# Patient Record
Sex: FEMALE | Race: WHITE | NOT HISPANIC OR LATINO | Employment: PART TIME | URBAN - NONMETROPOLITAN AREA
[De-identification: names, ages, dates, MRNs, and addresses within clinical notes are randomized per-mention and may not be internally consistent; named-entity substitution may affect disease eponyms.]

---
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
## 2018-05-01 ENCOUNTER — OFFICE VISIT (OUTPATIENT)
Dept: OBSTETRICS AND GYNECOLOGY | Facility: CLINIC | Age: 18
End: 2018-05-01

## 2018-05-01 VITALS
WEIGHT: 238 LBS | BODY MASS INDEX: 46.72 KG/M2 | HEART RATE: 96 BPM | DIASTOLIC BLOOD PRESSURE: 80 MMHG | HEIGHT: 60 IN | SYSTOLIC BLOOD PRESSURE: 113 MMHG

## 2018-05-01 DIAGNOSIS — Z11.3 SCREEN FOR SEXUALLY TRANSMITTED DISEASES: ICD-10-CM

## 2018-05-01 DIAGNOSIS — Z30.011 ENCOUNTER FOR INITIAL PRESCRIPTION OF CONTRACEPTIVE PILLS: Primary | ICD-10-CM

## 2018-05-01 PROCEDURE — 99203 OFFICE O/P NEW LOW 30 MIN: CPT | Performed by: NURSE PRACTITIONER

## 2018-05-01 PROCEDURE — 87491 CHLMYD TRACH DNA AMP PROBE: CPT | Performed by: NURSE PRACTITIONER

## 2018-05-01 PROCEDURE — 87591 N.GONORRHOEAE DNA AMP PROB: CPT | Performed by: NURSE PRACTITIONER

## 2018-05-01 PROCEDURE — 87661 TRICHOMONAS VAGINALIS AMPLIF: CPT | Performed by: NURSE PRACTITIONER

## 2018-05-01 RX ORDER — NORETHINDRONE ACETATE AND ETHINYL ESTRADIOL 1MG-20(21)
1 KIT ORAL DAILY
Qty: 28 TABLET | Refills: 3 | Status: SHIPPED | OUTPATIENT
Start: 2018-05-01 | End: 2019-05-01

## 2018-05-02 ENCOUNTER — TELEPHONE (OUTPATIENT)
Dept: OBSTETRICS AND GYNECOLOGY | Facility: CLINIC | Age: 18
End: 2018-05-02

## 2018-05-02 LAB
C TRACH RRNA CVX QL NAA+PROBE: POSITIVE
N GONORRHOEA RRNA SPEC QL NAA+PROBE: NEGATIVE
T VAGINALIS DNA VAG QL PROBE+SIG AMP: NEGATIVE

## 2018-05-02 RX ORDER — AZITHROMYCIN 500 MG/1
TABLET, FILM COATED ORAL
Qty: 4 TABLET | Refills: 0 | Status: SHIPPED | OUTPATIENT
Start: 2018-05-02 | End: 2020-01-06

## 2018-05-02 NOTE — TELEPHONE ENCOUNTER
----- Message from JOSTIN Delgado sent at 5/2/2018 12:55 PM CDT -----  + for chlamydia. Needs azithromycin 500mg 4 tablets by mouth x1 with food. Partner also needs to be treated prior to resuming intercourse.

## 2018-05-03 PROBLEM — Z30.011 ENCOUNTER FOR INITIAL PRESCRIPTION OF CONTRACEPTIVE PILLS: Status: ACTIVE | Noted: 2018-05-03

## 2018-05-03 NOTE — PROGRESS NOTES
Subjective   Ruth Mancini is a 18 y.o. female. G0 here to discuss birth control options. Has no concerns with her periods at this time but needs contraception.     LMP-  4/21/18  Last pap never  Never had STD screening      Gynecologic Exam   The patient's pertinent negatives include no genital itching, genital lesions, genital odor, genital rash, missed menses, pelvic pain, vaginal bleeding or vaginal discharge. Pertinent negatives include no abdominal pain, constipation, diarrhea, dysuria, headaches, nausea, rash, urgency or vomiting. She is sexually active. No, her partner does not have an STD. She uses nothing for contraception. Her menstrual history has been irregular (Monthly but varies by about 1 week).       The following portions of the patient's history were reviewed and updated as appropriate: allergies, current medications, past family history, past medical history, past social history, past surgical history and problem list.    Review of Systems   Constitutional: Negative for activity change, appetite change, fatigue and unexpected weight change.   HENT: Negative for congestion and trouble swallowing.    Respiratory: Negative for chest tightness and shortness of breath.    Cardiovascular: Negative for chest pain, palpitations and leg swelling.   Gastrointestinal: Negative for abdominal distention, abdominal pain, blood in stool, constipation, diarrhea, nausea and vomiting.   Endocrine: Negative for cold intolerance, heat intolerance, polydipsia, polyphagia and polyuria.   Genitourinary: Negative for difficulty urinating, dyspareunia, dysuria, genital sores, menstrual problem, missed menses, pelvic pain, urgency, vaginal bleeding, vaginal discharge and vaginal pain.   Musculoskeletal: Negative for gait problem and myalgias.   Skin: Negative for color change, pallor and rash.   Neurological: Negative for dizziness, weakness, light-headedness and headaches.   Hematological: Negative for adenopathy.    Psychiatric/Behavioral: Negative for agitation, confusion, dysphoric mood, self-injury and suicidal ideas. The patient is not nervous/anxious.        Objective   Physical Exam   Constitutional: She is oriented to person, place, and time. Vital signs are normal. She appears well-developed and well-nourished. No distress.   Cardiovascular: Normal rate and regular rhythm.    Pulmonary/Chest: Effort normal and breath sounds normal.   Neurological: She is alert and oriented to person, place, and time.   Skin: Skin is warm and dry. Capillary refill takes less than 2 seconds. She is not diaphoretic.   Psychiatric: She has a normal mood and affect.   Nursing note and vitals reviewed.      Assessment/Plan   Ruth was seen today for gynecologic exam.    Diagnoses and all orders for this visit:    Encounter for initial prescription of contraceptive pills    Screen for sexually transmitted diseases  -     Chlamydia trachomatis, Neisseria gonorrhoeae, Trichomonas vaginalis, PCR - Urine, Urine, Random Void    Other orders  -     norethindrone-ethinyl estradiol FE (JUNEL FE 1/20) 1-20 MG-MCG per tablet; Take 1 tablet by mouth Daily.       Education given regarding options for contraception, including barrier methods, injectable contraception, IUD placement, oral contraceptives, Xulane, Nuvaring or Nexplanon. She would like to start with an OCP. Took them in early teens and had no issues with them. R/B/A and administration discussed.

## 2019-06-03 ENCOUNTER — HOSPITAL ENCOUNTER (EMERGENCY)
Facility: HOSPITAL | Age: 19
Discharge: HOME | End: 2019-06-03
Attending: FAMILY MEDICINE | Admitting: FAMILY MEDICINE

## 2019-06-03 VITALS
RESPIRATION RATE: 18 BRPM | SYSTOLIC BLOOD PRESSURE: 115 MMHG | DIASTOLIC BLOOD PRESSURE: 79 MMHG | HEART RATE: 79 BPM | BODY MASS INDEX: 44.62 KG/M2 | WEIGHT: 227.3 LBS | TEMPERATURE: 98.6 F | OXYGEN SATURATION: 99 % | HEIGHT: 60 IN

## 2019-06-03 DIAGNOSIS — R21 RASH: Primary | ICD-10-CM

## 2019-06-03 DIAGNOSIS — K62.5 RECTAL BLEEDING: ICD-10-CM

## 2019-06-03 LAB
ALBUMIN SERPL-MCNC: 4 G/DL (ref 3.5–5.2)
ALBUMIN/GLOB SERPL: 1.2 G/DL
ALP SERPL-CCNC: 66 U/L (ref 39–117)
ALT SERPL W P-5'-P-CCNC: 10 U/L (ref 1–33)
ANION GAP SERPL CALCULATED.3IONS-SCNC: 11 MMOL/L
APTT PPP: 30.9 SECONDS (ref 20–40.3)
AST SERPL-CCNC: 10 U/L (ref 1–32)
BASOPHILS # BLD AUTO: 0.06 10*3/MM3 (ref 0–0.2)
BASOPHILS NFR BLD AUTO: 0.8 % (ref 0–1.5)
BILIRUB SERPL-MCNC: 0.5 MG/DL (ref 0.2–1.2)
BUN BLD-MCNC: 11 MG/DL (ref 6–20)
BUN/CREAT SERPL: 16.4 (ref 7–25)
CALCIUM SPEC-SCNC: 9.3 MG/DL (ref 8.6–10.5)
CHLORIDE SERPL-SCNC: 103 MMOL/L (ref 98–107)
CO2 SERPL-SCNC: 25 MMOL/L (ref 22–29)
CREAT BLD-MCNC: 0.67 MG/DL (ref 0.57–1)
DEPRECATED RDW RBC AUTO: 36.7 FL (ref 37–54)
EOSINOPHIL # BLD AUTO: 0.46 10*3/MM3 (ref 0–0.4)
EOSINOPHIL NFR BLD AUTO: 5.9 % (ref 0.3–6.2)
ERYTHROCYTE [DISTWIDTH] IN BLOOD BY AUTOMATED COUNT: 12 % (ref 12.3–15.4)
GFR SERPL CREATININE-BSD FRML MDRD: 111 ML/MIN/1.73
GLOBULIN UR ELPH-MCNC: 3.4 GM/DL
GLUCOSE BLD-MCNC: 96 MG/DL (ref 65–99)
HCG SERPL QL: NEGATIVE
HCT VFR BLD AUTO: 41.3 % (ref 34–46.6)
HGB BLD-MCNC: 14.2 G/DL (ref 12–15.9)
IMM GRANULOCYTES # BLD AUTO: 0.02 10*3/MM3 (ref 0–0.05)
IMM GRANULOCYTES NFR BLD AUTO: 0.3 % (ref 0–0.5)
INR PPP: 0.93 (ref 0.8–1.2)
LYMPHOCYTES # BLD AUTO: 1.86 10*3/MM3 (ref 0.7–3.1)
LYMPHOCYTES NFR BLD AUTO: 24 % (ref 19.6–45.3)
MCH RBC QN AUTO: 28.5 PG (ref 26.6–33)
MCHC RBC AUTO-ENTMCNC: 34.4 G/DL (ref 31.5–35.7)
MCV RBC AUTO: 82.9 FL (ref 79–97)
MONOCYTES # BLD AUTO: 0.74 10*3/MM3 (ref 0.1–0.9)
MONOCYTES NFR BLD AUTO: 9.5 % (ref 5–12)
NEUTROPHILS # BLD AUTO: 4.61 10*3/MM3 (ref 1.7–7)
NEUTROPHILS NFR BLD AUTO: 59.5 % (ref 42.7–76)
NRBC BLD AUTO-RTO: 0 /100 WBC (ref 0–0.2)
PLATELET # BLD AUTO: 282 10*3/MM3 (ref 140–450)
PMV BLD AUTO: 9.7 FL (ref 6–12)
POTASSIUM BLD-SCNC: 3.9 MMOL/L (ref 3.5–5.2)
PROT SERPL-MCNC: 7.4 G/DL (ref 6–8.5)
PROTHROMBIN TIME: 12.3 SECONDS (ref 11.1–15.3)
RBC # BLD AUTO: 4.98 10*6/MM3 (ref 3.77–5.28)
SODIUM BLD-SCNC: 139 MMOL/L (ref 136–145)
WBC NRBC COR # BLD: 7.75 10*3/MM3 (ref 3.4–10.8)

## 2019-06-03 PROCEDURE — 85730 THROMBOPLASTIN TIME PARTIAL: CPT | Performed by: FAMILY MEDICINE

## 2019-06-03 PROCEDURE — 85610 PROTHROMBIN TIME: CPT | Performed by: FAMILY MEDICINE

## 2019-06-03 PROCEDURE — 80053 COMPREHEN METABOLIC PANEL: CPT | Performed by: FAMILY MEDICINE

## 2019-06-03 PROCEDURE — 84703 CHORIONIC GONADOTROPIN ASSAY: CPT | Performed by: FAMILY MEDICINE

## 2019-06-03 PROCEDURE — 85025 COMPLETE CBC W/AUTO DIFF WBC: CPT | Performed by: FAMILY MEDICINE

## 2019-06-03 PROCEDURE — 99283 EMERGENCY DEPT VISIT LOW MDM: CPT

## 2019-06-03 RX ORDER — SODIUM CHLORIDE 0.9 % (FLUSH) 0.9 %
10 SYRINGE (ML) INJECTION AS NEEDED
Status: DISCONTINUED | OUTPATIENT
Start: 2019-06-03 | End: 2019-06-03 | Stop reason: HOSPADM

## 2019-06-03 RX ORDER — SERTRALINE HYDROCHLORIDE 25 MG/1
25 TABLET, FILM COATED ORAL DAILY
COMMUNITY
End: 2020-01-06

## 2019-06-03 NOTE — ED PROVIDER NOTES
Subjective   19-year-old morbidly obese female emergency department with a rash on her left ankle and bilateral wrists.  She noted these yesterday.  At the of her discussion, she states that she had painless bleeding from her rectum yesterday.  She states that she had multiple clots and a lot of bleeding noted.  She is never had similar symptoms recently.  She had a normal earlier today and states that she has normal bleeding issues with those but nothing as significant as yesterday.            Review of Systems   Constitutional: Negative for activity change, appetite change, chills, fatigue, fever and unexpected weight change.   HENT: Negative for nosebleeds, rhinorrhea, sore throat, trouble swallowing and voice change.    Eyes: Negative for photophobia, pain and visual disturbance.   Respiratory: Negative for apnea, cough, chest tightness, shortness of breath, wheezing and stridor.    Cardiovascular: Negative for chest pain, palpitations and leg swelling.   Gastrointestinal: Positive for blood in stool. Negative for abdominal distention, abdominal pain, constipation, diarrhea, nausea and vomiting.   Endocrine: Negative for cold intolerance, heat intolerance, polydipsia and polyuria.   Genitourinary: Negative for decreased urine volume, difficulty urinating, dysuria, flank pain, hematuria and urgency.   Musculoskeletal: Negative for arthralgias, myalgias, neck pain and neck stiffness.   Skin: Negative for color change, pallor and rash.   Allergic/Immunologic: Negative for immunocompromised state.   Neurological: Negative for dizziness, seizures, syncope, weakness, light-headedness and numbness.   Hematological: Negative for adenopathy.   Psychiatric/Behavioral: Negative for agitation, confusion, dysphoric mood and suicidal ideas. The patient is not nervous/anxious.        Past Medical History:   Diagnosis Date   • Allergic asthma     IgE mediated   • Allergic conjunctivitis    • Allergic rhinitis    • Allergic  rhinitis due to pollen    • Astigmatism    • Common cold    • Conjunctivitis    • Cough    • Diabetes mellitus (CMS/HCC)    • Diarrhea    • Disorder of skin    • Dysfunction of eustachian tube    • Dysmenorrhea    • Exanthematous disorder    • Food poisoning    • Headache    • Hyperglycemia     mom says she is borderline diabetic   • Ingrowing nail    • Insect bite     nonvenomous   • Intrinsic asthma without status asthmaticus    • Irregular periods    • Knee pain     patellofemoral syndrome   • Nausea and vomiting    • Need for immunization against influenza    • Otalgia, right ear    • Pain in right arm     bruising right forearm   • Pain in throat    • Pain in toe    • Pharyngitis    • Rhinorrhea     posterior   • Rib pain     actually intercostal spasm   • Tick bite     history of tick in right ear with abrasion of the ear canal   • Upper respiratory infection    • Wheezing        Allergies   Allergen Reactions   • Naprosyn [Naproxen] Palpitations     twitching       Past Surgical History:   Procedure Laterality Date   • NO PAST SURGERIES     • OTHER SURGICAL HISTORY  01/20/2015    avulsion of nail plate       Family History   Problem Relation Age of Onset   • Ovarian cysts Mother    • Anemia Mother    • Other Brother         arthrogryposis   • Alzheimer's disease Other    • Asthma Other    • ADD / ADHD Other    • Bipolar disorder Other    • Breast cancer Other    • Depression Other    • Diabetes Other    • Hyperlipidemia Other    • Hypertension Other    • Lung cancer Other    • Mental illness Other    • Ovarian cancer Other    • Rheum arthritis Other    • Stroke Other    • Other Other         toxemia of pregnancy   • Psoriasis Other    • Samayoa-Kendall syndrome Other        Social History     Socioeconomic History   • Marital status: Single     Spouse name: Not on file   • Number of children: Not on file   • Years of education: Not on file   • Highest education level: Not on file   Tobacco Use   • Smoking  status: Current Every Day Smoker     Packs/day: 1.00     Types: Cigarettes   • Smokeless tobacco: Never Used   Substance and Sexual Activity   • Alcohol use: No   • Drug use: No   • Sexual activity: Defer           Objective   Physical Exam   Constitutional: She is oriented to person, place, and time. She appears well-developed and well-nourished. No distress.   HENT:   Head: Normocephalic and atraumatic.   Right Ear: External ear normal.   Left Ear: External ear normal.   Mouth/Throat: Oropharynx is clear and moist. No oropharyngeal exudate.   Eyes: Conjunctivae and EOM are normal. Pupils are equal, round, and reactive to light. Right eye exhibits no discharge. Left eye exhibits no discharge. No scleral icterus.   Neck: Neck supple. No JVD present. No tracheal deviation present. No thyromegaly present.   Cardiovascular: Normal rate, regular rhythm, normal heart sounds and intact distal pulses. Exam reveals no friction rub.   No murmur heard.  Pulmonary/Chest: Effort normal and breath sounds normal. No stridor. No respiratory distress. She has no wheezes. She has no rales. She exhibits no tenderness.   Abdominal: Soft. Bowel sounds are normal. She exhibits no distension and no mass. There is no tenderness. There is no rebound and no guarding.   Genitourinary: Rectal exam shows guaiac negative stool.   Genitourinary Comments: Normal rectal tone. No gross blood.    Musculoskeletal: She exhibits no edema, tenderness or deformity.   Lymphadenopathy:     She has no cervical adenopathy.   Neurological: She is alert and oriented to person, place, and time. No cranial nerve deficit. She exhibits normal muscle tone. Coordination normal.   Skin: Skin is warm and dry. Capillary refill takes 2 to 3 seconds. Rash (1cm area of mildly erythematous plaque on L foot. Small (1mm) flesh colored papules on anterior L wrist. ) noted. She is not diaphoretic. No erythema.   Psychiatric: She has a normal mood and affect. Her behavior is  normal. Judgment and thought content normal.   Nursing note and vitals reviewed.      Procedures           ED Course  ED Course as of Jun 03 1750   Mon Jun 03, 2019   1745 Patient was placed in room 24 and evaluated by me.  physical exam was not concerning.  Labs were obtained which were normal.  Rectal exam was guaiac negative.  At this point I believe she is medically stable for discharge we will follow-up with your primary care provider.  [CE]      ED Course User Index  [CE] Pravin Licona DO          Labs Reviewed   CBC WITH AUTO DIFFERENTIAL - Abnormal; Notable for the following components:       Result Value    RDW 12.0 (*)     RDW-SD 36.7 (*)     Eosinophils, Absolute 0.46 (*)     All other components within normal limits   PROTIME-INR - Normal    Narrative:     Therapeutic range for most indications is 2.0-3.0 INR,  or 2.5-3.5 for mechanical heart valves.   APTT - Normal    Narrative:     The recommended Heparin therapeutic range is 68-97 seconds.   HCG, SERUM, QUALITATIVE - Normal   COMPREHENSIVE METABOLIC PANEL    Narrative:     GFR Normal >60  Chronic Kidney Disease <60  Kidney Failure <15   CBC AND DIFFERENTIAL    Narrative:     The following orders were created for panel order CBC & Differential.  Procedure                               Abnormality         Status                     ---------                               -----------         ------                     CBC Auto Differential[[id number]]        Abnormal            Final result                 Please view results for these tests on the individual orders.     No results found.        MDM      Final diagnoses:   Rash   Rectal bleeding            Pravin Licona DO  06/03/19 [id number]

## 2019-12-12 ENCOUNTER — INITIAL PRENATAL (OUTPATIENT)
Dept: OBSTETRICS AND GYNECOLOGY | Facility: CLINIC | Age: 19
End: 2019-12-12

## 2019-12-12 ENCOUNTER — APPOINTMENT (OUTPATIENT)
Dept: LAB | Facility: HOSPITAL | Age: 19
End: 2019-12-12

## 2019-12-12 VITALS — DIASTOLIC BLOOD PRESSURE: 76 MMHG | SYSTOLIC BLOOD PRESSURE: 110 MMHG | WEIGHT: 227 LBS | BODY MASS INDEX: 44.33 KG/M2

## 2019-12-12 DIAGNOSIS — Z86.59 HISTORY OF DEPRESSION: ICD-10-CM

## 2019-12-12 DIAGNOSIS — Z34.00 SUPERVISION OF NORMAL FIRST PREGNANCY, ANTEPARTUM: Primary | ICD-10-CM

## 2019-12-12 DIAGNOSIS — E66.01 MATERNAL MORBID OBESITY IN FIRST TRIMESTER, ANTEPARTUM (HCC): ICD-10-CM

## 2019-12-12 DIAGNOSIS — Z23 INFLUENZA VACCINATION GIVEN: ICD-10-CM

## 2019-12-12 DIAGNOSIS — Z34.01 PRIMIGRAVIDA IN FIRST TRIMESTER: ICD-10-CM

## 2019-12-12 DIAGNOSIS — Z86.69 HISTORY OF MIGRAINE HEADACHES: ICD-10-CM

## 2019-12-12 DIAGNOSIS — O99.211 MATERNAL MORBID OBESITY IN FIRST TRIMESTER, ANTEPARTUM (HCC): ICD-10-CM

## 2019-12-12 DIAGNOSIS — O99.331 MATERNAL TOBACCO USE IN FIRST TRIMESTER: ICD-10-CM

## 2019-12-12 DIAGNOSIS — F41.9 ANXIETY: ICD-10-CM

## 2019-12-12 DIAGNOSIS — Z36.87 ENCOUNTER FOR ANTENATAL SCREENING FOR UNCERTAIN DATES: ICD-10-CM

## 2019-12-12 DIAGNOSIS — Z32.01 POSITIVE PREGNANCY TEST: Primary | ICD-10-CM

## 2019-12-12 LAB
ABO GROUP BLD: NORMAL
AMPHET+METHAMPHET UR QL: NEGATIVE
AMPHETAMINES UR QL: NEGATIVE
BARBITURATES UR QL SCN: NEGATIVE
BASOPHILS # BLD AUTO: 0.06 10*3/MM3 (ref 0–0.2)
BASOPHILS NFR BLD AUTO: 0.8 % (ref 0–1.5)
BENZODIAZ UR QL SCN: NEGATIVE
BILIRUB UR QL STRIP: NEGATIVE
BLD GP AB SCN SERPL QL: NEGATIVE
BUPRENORPHINE SERPL-MCNC: NEGATIVE NG/ML
CANNABINOIDS SERPL QL: POSITIVE
CLARITY UR: ABNORMAL
COCAINE UR QL: NEGATIVE
COLOR UR: ABNORMAL
DEPRECATED RDW RBC AUTO: 40.3 FL (ref 37–54)
EOSINOPHIL # BLD AUTO: 0.19 10*3/MM3 (ref 0–0.4)
EOSINOPHIL NFR BLD AUTO: 2.6 % (ref 0.3–6.2)
ERYTHROCYTE [DISTWIDTH] IN BLOOD BY AUTOMATED COUNT: 12.9 % (ref 12.3–15.4)
GLUCOSE UR STRIP-MCNC: NEGATIVE MG/DL
HBV SURFACE AG SERPL QL IA: NORMAL
HCT VFR BLD AUTO: 42.1 % (ref 34–46.6)
HCV AB SER DONR QL: NORMAL
HGB BLD-MCNC: 14.5 G/DL (ref 12–15.9)
HGB UR QL STRIP.AUTO: NEGATIVE
HIV1+2 AB SER QL: NORMAL
IMM GRANULOCYTES # BLD AUTO: 0.03 10*3/MM3 (ref 0–0.05)
IMM GRANULOCYTES NFR BLD AUTO: 0.4 % (ref 0–0.5)
KETONES UR QL STRIP: ABNORMAL
LEUKOCYTE ESTERASE UR QL STRIP.AUTO: ABNORMAL
LYMPHOCYTES # BLD AUTO: 1.51 10*3/MM3 (ref 0.7–3.1)
LYMPHOCYTES NFR BLD AUTO: 20.4 % (ref 19.6–45.3)
Lab: NORMAL
MCH RBC QN AUTO: 29.5 PG (ref 26.6–33)
MCHC RBC AUTO-ENTMCNC: 34.4 G/DL (ref 31.5–35.7)
MCV RBC AUTO: 85.6 FL (ref 79–97)
METHADONE UR QL SCN: NEGATIVE
MONOCYTES # BLD AUTO: 0.7 10*3/MM3 (ref 0.1–0.9)
MONOCYTES NFR BLD AUTO: 9.5 % (ref 5–12)
NEUTROPHILS # BLD AUTO: 4.91 10*3/MM3 (ref 1.7–7)
NEUTROPHILS NFR BLD AUTO: 66.3 % (ref 42.7–76)
NITRITE UR QL STRIP: NEGATIVE
NRBC BLD AUTO-RTO: 0 /100 WBC (ref 0–0.2)
OPIATES UR QL: NEGATIVE
OXYCODONE UR QL SCN: NEGATIVE
PCP UR QL SCN: NEGATIVE
PH UR STRIP.AUTO: 5.5 [PH] (ref 5–8)
PLATELET # BLD AUTO: 346 10*3/MM3 (ref 140–450)
PMV BLD AUTO: 9.9 FL (ref 6–12)
PROPOXYPH UR QL: NEGATIVE
PROT UR QL STRIP: ABNORMAL
RBC # BLD AUTO: 4.92 10*6/MM3 (ref 3.77–5.28)
RH BLD: POSITIVE
RPR SER QL: NORMAL
SP GR UR STRIP: ABNORMAL
TRICYCLICS UR QL SCN: NEGATIVE
UROBILINOGEN UR QL STRIP: ABNORMAL
WBC NRBC COR # BLD: 7.4 10*3/MM3 (ref 3.4–10.8)

## 2019-12-12 PROCEDURE — 81003 URINALYSIS AUTO W/O SCOPE: CPT | Performed by: NURSE PRACTITIONER

## 2019-12-12 PROCEDURE — 80307 DRUG TEST PRSMV CHEM ANLYZR: CPT | Performed by: NURSE PRACTITIONER

## 2019-12-12 PROCEDURE — 36415 COLL VENOUS BLD VENIPUNCTURE: CPT

## 2019-12-12 PROCEDURE — 87086 URINE CULTURE/COLONY COUNT: CPT | Performed by: NURSE PRACTITIONER

## 2019-12-12 PROCEDURE — 87591 N.GONORRHOEAE DNA AMP PROB: CPT | Performed by: NURSE PRACTITIONER

## 2019-12-12 PROCEDURE — 90471 IMMUNIZATION ADMIN: CPT | Performed by: NURSE PRACTITIONER

## 2019-12-12 PROCEDURE — 86803 HEPATITIS C AB TEST: CPT | Performed by: NURSE PRACTITIONER

## 2019-12-12 PROCEDURE — 80081 OBSTETRIC PANEL INC HIV TSTG: CPT | Performed by: NURSE PRACTITIONER

## 2019-12-12 PROCEDURE — 90686 IIV4 VACC NO PRSV 0.5 ML IM: CPT | Performed by: NURSE PRACTITIONER

## 2019-12-12 PROCEDURE — 87491 CHLMYD TRACH DNA AMP PROBE: CPT | Performed by: NURSE PRACTITIONER

## 2019-12-12 PROCEDURE — 87661 TRICHOMONAS VAGINALIS AMPLIF: CPT | Performed by: NURSE PRACTITIONER

## 2019-12-12 PROCEDURE — 99214 OFFICE O/P EST MOD 30 MIN: CPT | Performed by: NURSE PRACTITIONER

## 2019-12-12 NOTE — PROGRESS NOTES
I spent approximately 60 minutes with the patient acquiring the health and history intake and discussing topics related to healthy lifestyle. This is her first pregnancy. A newob bag is given. The 1st trimester teaching was done with the patient. We discussed a healthy diet and exercise and what is recommended. I also discussed Listeriosis and Toxoplasmosis and what fish to avoid due to high mercury levels. Informed patient not to be in hot tubs, saunas, or tanning beds. We discussed that spotting may occur after intercourse which is common, but if heavy bleeding like a period occurs to call the [facility] or hospital if clinic is closed.  I encouraged her to make an appointment with the dentist if she has not had a dental exam and cleaning in the last 6 months. I instructed the patient that alcohol, illicit drug use, and tobacco smoking should be avoided in pregnancy. The patient does  Smoke 1/2 pk cigarettes per day. She plans to quit smoking. We discussed the hospital policy procedure if a patient has a positive urine drug screen at the time of admission to the hospital. She plans to breastfeed. I gave her pamphlet on breastfeeding classes and the breastfeeding mothers support group that is provided by Simi Bucio, Lactation Consultant. We discussed the resources that is offered at the health departments, and we discussed that some health departments have a breastfeeding peer counselor. I informed patient that group prenatal education classes are offered here. I instructed patient to let the provider know if she would like to join a group after EDC is established.  I discussed with the patient that a pediatrician needs to be chosen prior to delivery for the infant to have an appointment scheduled before leaving the hospital.   I discussed lab tests will be done today. I encouraged the patient to get the TDAP vaccine in the 3rd trimester. I told the patient that health departments are giving the TDAP vaccine to  family members. All questions were answered at this time.

## 2019-12-13 LAB
BACTERIA SPEC AEROBE CULT: NORMAL
C TRACH RRNA CVX QL NAA+PROBE: NEGATIVE
N GONORRHOEA RRNA SPEC QL NAA+PROBE: NEGATIVE
RUBV IGG SERPL IA-ACNC: POSITIVE
TRICHOMONAS VAGINALIS PCR: NEGATIVE

## 2019-12-16 PROBLEM — Z30.011 ENCOUNTER FOR INITIAL PRESCRIPTION OF CONTRACEPTIVE PILLS: Status: RESOLVED | Noted: 2018-05-03 | Resolved: 2019-12-16

## 2019-12-16 PROBLEM — O99.331 MATERNAL TOBACCO USE IN FIRST TRIMESTER: Status: ACTIVE | Noted: 2019-12-16

## 2019-12-16 PROBLEM — O99.211 MATERNAL MORBID OBESITY IN FIRST TRIMESTER, ANTEPARTUM: Status: ACTIVE | Noted: 2019-12-16

## 2019-12-16 PROBLEM — E66.01 MATERNAL MORBID OBESITY IN FIRST TRIMESTER, ANTEPARTUM (HCC): Status: ACTIVE | Noted: 2019-12-16

## 2019-12-16 PROBLEM — F41.9 ANXIETY: Status: ACTIVE | Noted: 2019-12-16

## 2019-12-16 PROBLEM — Z86.59 HISTORY OF DEPRESSION: Status: ACTIVE | Noted: 2019-12-16

## 2019-12-16 PROBLEM — Z34.01 PRIMIGRAVIDA IN FIRST TRIMESTER: Status: ACTIVE | Noted: 2019-12-16

## 2019-12-16 NOTE — PROGRESS NOTES
CC: Initial Prenatal visit    Ruth Mancini is a 19 y.o.  presents to establish prenatal care with no complaints.     Past Medical History:   Diagnosis Date   • Allergic asthma     IgE mediated   • Allergic conjunctivitis    • Allergic rhinitis    • Allergic rhinitis due to pollen    • Astigmatism    • Chlamydia    • Common cold    • Conjunctivitis    • Cough    • Depression    • Diarrhea    • Disorder of skin    • Dysfunction of eustachian tube    • Dysmenorrhea    • Exanthematous disorder    • Food poisoning    • Headache    • Herpes    • Hyperglycemia     mom says she is borderline diabetic   • Ingrowing nail    • Insect bite     nonvenomous   • Intrinsic asthma without status asthmaticus    • Irregular periods    • Knee pain     patellofemoral syndrome   • Migraine    • Nausea and vomiting    • Need for immunization against influenza    • Otalgia, right ear    • Pain in right arm     bruising right forearm   • Pain in throat    • Pain in toe    • Pharyngitis    • Rhinorrhea     posterior   • Rib pain     actually intercostal spasm   • Substance abuse (CMS/HCC)    • Tick bite     history of tick in right ear with abrasion of the ear canal   • Upper respiratory infection    • Wheezing      Past Surgical History:   Procedure Laterality Date   • NO PAST SURGERIES     • OTHER SURGICAL HISTORY  2015    avulsion of nail plate     Social History     Socioeconomic History   • Marital status: Single     Spouse name: Not on file   • Number of children: Not on file   • Years of education: Not on file   • Highest education level: Not on file   Tobacco Use   • Smoking status: Current Every Day Smoker     Packs/day: 0.50     Types: Cigarettes   • Smokeless tobacco: Never Used   Substance and Sexual Activity   • Alcohol use: No   • Drug use: No   • Sexual activity: Yes     Partners: Male     Comment: last pap smear 19 negative     OB History    Para Term  AB Living   1             SAB TAB  Ectopic Molar Multiple Live Births                    # Outcome Date GA Lbr Brad/2nd Weight Sex Delivery Anes PTL Lv   1 Current              ROS: Pt denies cramping, dysuria, or vaginal bleeding.      See NOB physical in episode tab, PE non-remarkable             Problems (from 17 to present)     Problem Noted Resolved    Maternal morbid obesity in first trimester, antepartum (CMS/HCC) 2019 by Misti Mondragon APRN No    Anxiety 2019 by Misti Mondragon APRN No    History of depression 2019 by Misti Mondragon APRN No          A/P: Ruth Mancini is a 19 y.o.  at Unknown.  - RTC in 2-3 weeks for SHIRLENE appt or sooner if needed        Diagnosis Plan   1. Positive pregnancy test     2. Encounter for  screening for uncertain dates  US Ob Transvaginal   3. Influenza vaccination given  influenza vac split quad (FLUZONE,FLUARIX,AFLURIA) injection 0.5 mL   4. Maternal morbid obesity in first trimester, antepartum (CMS/[facility])  Needs early 1 hour ogtt   5. History of depression  Doing well, denies SI/HI   6. Anxiety     7. History of migraine headaches     8.      Maternal tobacco use     Pt educated on risks in pregnancy, strongly encouraged         complete smoking cessation  9.       Primigravida      Hx of labs +HSV AB, pt reports no history of genital outbreak    At least 50% of this patient encounter was spent in face to face pt contact    JOSTIN Field  2019  4:59 PM

## 2019-12-20 ENCOUNTER — HOSPITAL ENCOUNTER (EMERGENCY)
Facility: HOSPITAL | Age: 19
Discharge: HOME | End: 2019-12-20
Attending: FAMILY MEDICINE | Admitting: FAMILY MEDICINE

## 2019-12-20 VITALS
DIASTOLIC BLOOD PRESSURE: 72 MMHG | WEIGHT: 235.1 LBS | TEMPERATURE: 98 F | RESPIRATION RATE: 18 BRPM | BODY MASS INDEX: 46.16 KG/M2 | HEIGHT: 60 IN | SYSTOLIC BLOOD PRESSURE: 107 MMHG | OXYGEN SATURATION: 99 % | HEART RATE: 74 BPM

## 2019-12-20 DIAGNOSIS — N30.00 ACUTE CYSTITIS WITHOUT HEMATURIA: ICD-10-CM

## 2019-12-20 DIAGNOSIS — R10.9 FLANK PAIN: ICD-10-CM

## 2019-12-20 DIAGNOSIS — Z3A.01 LESS THAN 8 WEEKS GESTATION OF PREGNANCY: Primary | ICD-10-CM

## 2019-12-20 LAB
ALBUMIN SERPL-MCNC: 4 G/DL (ref 3.5–5.2)
ALBUMIN/GLOB SERPL: 1.4 G/DL
ALP SERPL-CCNC: 48 U/L (ref 39–117)
ALT SERPL W P-5'-P-CCNC: 11 U/L (ref 1–33)
ANION GAP SERPL CALCULATED.3IONS-SCNC: 10 MMOL/L (ref 5–15)
AST SERPL-CCNC: 9 U/L (ref 1–32)
BACTERIA UR QL AUTO: ABNORMAL /HPF
BASOPHILS # BLD AUTO: 0.05 10*3/MM3 (ref 0–0.2)
BASOPHILS NFR BLD AUTO: 0.6 % (ref 0–1.5)
BILIRUB SERPL-MCNC: 0.3 MG/DL (ref 0.2–1.2)
BILIRUB UR QL STRIP: NEGATIVE
BUN BLD-MCNC: 10 MG/DL (ref 6–20)
BUN/CREAT SERPL: 16.7 (ref 7–25)
CALCIUM SPEC-SCNC: 9.4 MG/DL (ref 8.6–10.5)
CHLORIDE SERPL-SCNC: 101 MMOL/L (ref 98–107)
CK SERPL-CCNC: 49 U/L (ref 20–180)
CLARITY UR: ABNORMAL
CO2 SERPL-SCNC: 25 MMOL/L (ref 22–29)
COLOR UR: YELLOW
CREAT BLD-MCNC: 0.6 MG/DL (ref 0.57–1)
DEPRECATED RDW RBC AUTO: 37.2 FL (ref 37–54)
EOSINOPHIL # BLD AUTO: 0.35 10*3/MM3 (ref 0–0.4)
EOSINOPHIL NFR BLD AUTO: 4 % (ref 0.3–6.2)
ERYTHROCYTE [DISTWIDTH] IN BLOOD BY AUTOMATED COUNT: 12.3 % (ref 12.3–15.4)
GFR SERPL CREATININE-BSD FRML MDRD: 126 ML/MIN/1.73
GLOBULIN UR ELPH-MCNC: 2.8 GM/DL
GLUCOSE BLD-MCNC: 83 MG/DL (ref 65–99)
GLUCOSE UR STRIP-MCNC: NEGATIVE MG/DL
HCG INTACT+B SERPL-ACNC: NORMAL MIU/ML
HCT VFR BLD AUTO: 37.2 % (ref 34–46.6)
HGB BLD-MCNC: 13 G/DL (ref 12–15.9)
HGB UR QL STRIP.AUTO: NEGATIVE
HYALINE CASTS UR QL AUTO: ABNORMAL /LPF
IMM GRANULOCYTES # BLD AUTO: 0.03 10*3/MM3 (ref 0–0.05)
IMM GRANULOCYTES NFR BLD AUTO: 0.3 % (ref 0–0.5)
KETONES UR QL STRIP: NEGATIVE
LEUKOCYTE ESTERASE UR QL STRIP.AUTO: ABNORMAL
LIPASE SERPL-CCNC: 20 U/L (ref 13–60)
LYMPHOCYTES # BLD AUTO: 2.3 10*3/MM3 (ref 0.7–3.1)
LYMPHOCYTES NFR BLD AUTO: 26.2 % (ref 19.6–45.3)
MAGNESIUM SERPL-MCNC: 1.8 MG/DL (ref 1.6–2.6)
MCH RBC QN AUTO: 28.7 PG (ref 26.6–33)
MCHC RBC AUTO-ENTMCNC: 34.9 G/DL (ref 31.5–35.7)
MCV RBC AUTO: 82.1 FL (ref 79–97)
MONOCYTES # BLD AUTO: 0.72 10*3/MM3 (ref 0.1–0.9)
MONOCYTES NFR BLD AUTO: 8.2 % (ref 5–12)
NEUTROPHILS # BLD AUTO: 5.33 10*3/MM3 (ref 1.7–7)
NEUTROPHILS NFR BLD AUTO: 60.7 % (ref 42.7–76)
NITRITE UR QL STRIP: NEGATIVE
NRBC BLD AUTO-RTO: 0 /100 WBC (ref 0–0.2)
PH UR STRIP.AUTO: 6 [PH] (ref 5–9)
PLATELET # BLD AUTO: 269 10*3/MM3 (ref 140–450)
PMV BLD AUTO: 9.8 FL (ref 6–12)
POTASSIUM BLD-SCNC: 3.8 MMOL/L (ref 3.5–5.2)
PROT SERPL-MCNC: 6.8 G/DL (ref 6–8.5)
PROT UR QL STRIP: NEGATIVE
RBC # BLD AUTO: 4.53 10*6/MM3 (ref 3.77–5.28)
RBC # UR: ABNORMAL /HPF
REF LAB TEST METHOD: ABNORMAL
SODIUM BLD-SCNC: 136 MMOL/L (ref 136–145)
SP GR UR STRIP: 1.03 (ref 1–1.03)
SQUAMOUS #/AREA URNS HPF: ABNORMAL /HPF
UROBILINOGEN UR QL STRIP: ABNORMAL
WBC NRBC COR # BLD: 8.78 10*3/MM3 (ref 3.4–10.8)
WBC UR QL AUTO: ABNORMAL /HPF
WHOLE BLOOD HOLD SPECIMEN: NORMAL
WHOLE BLOOD HOLD SPECIMEN: NORMAL

## 2019-12-20 PROCEDURE — 84702 CHORIONIC GONADOTROPIN TEST: CPT | Performed by: FAMILY MEDICINE

## 2019-12-20 PROCEDURE — 99284 EMERGENCY DEPT VISIT MOD MDM: CPT

## 2019-12-20 PROCEDURE — 81001 URINALYSIS AUTO W/SCOPE: CPT | Performed by: FAMILY MEDICINE

## 2019-12-20 PROCEDURE — 80053 COMPREHEN METABOLIC PANEL: CPT | Performed by: FAMILY MEDICINE

## 2019-12-20 PROCEDURE — 83690 ASSAY OF LIPASE: CPT | Performed by: FAMILY MEDICINE

## 2019-12-20 PROCEDURE — 82550 ASSAY OF CK (CPK): CPT | Performed by: FAMILY MEDICINE

## 2019-12-20 PROCEDURE — 83735 ASSAY OF MAGNESIUM: CPT | Performed by: FAMILY MEDICINE

## 2019-12-20 PROCEDURE — 85025 COMPLETE CBC W/AUTO DIFF WBC: CPT | Performed by: FAMILY MEDICINE

## 2019-12-20 RX ORDER — PNV NO.95/FERROUS FUM/FOLIC AC 28MG-0.8MG
1 TABLET ORAL DAILY
Qty: 30 TABLET | Refills: 0 | OUTPATIENT
Start: 2019-12-20 | End: 2020-02-07

## 2019-12-20 RX ORDER — ONDANSETRON 4 MG/1
4 TABLET, ORALLY DISINTEGRATING ORAL EVERY 6 HOURS PRN
Qty: 10 TABLET | Refills: 0 | OUTPATIENT
Start: 2019-12-20 | End: 2020-02-07

## 2019-12-20 RX ORDER — CEPHALEXIN 500 MG/1
500 CAPSULE ORAL 3 TIMES DAILY
Qty: 21 CAPSULE | Refills: 0 | Status: SHIPPED | OUTPATIENT
Start: 2019-12-20 | End: 2020-01-06

## 2019-12-21 NOTE — ED PROVIDER NOTES
Subjective   , LMP 10/28/19. [facility]       Pregnancy Problem   Patient reports no abdominal pain and no vaginal bleeding.   Associated symptoms include no dysuria, no fever, no headaches, no light-headedness, no nausea, no shortness of breath, no vomiting and no weakness.   Back Pain   Location:  Lumbar spine  Quality:  Aching  Pain severity:  Moderate  Onset quality:  Gradual  Duration:  2 days  Timing:  Intermittent  Progression:  Waxing and waning  Chronicity:  New  Context: not recent injury    Relieved by:  Nothing  Worsened by:  Nothing  Associated symptoms: no abdominal pain, no chest pain, no dysuria, no fever, no headaches and no weakness    Risk factors: pregnancy        Review of Systems   Constitutional: Negative for appetite change, chills, diaphoresis, fatigue and fever.   HENT: Negative for congestion, ear discharge, ear pain, nosebleeds, rhinorrhea, sinus pressure, sore throat and trouble swallowing.    Eyes: Negative for discharge and redness.   Respiratory: Negative for apnea, cough, chest tightness, shortness of breath and wheezing.    Cardiovascular: Negative for chest pain.   Gastrointestinal: Negative for abdominal pain, diarrhea, nausea and vomiting.   Endocrine: Negative for polyuria.   Genitourinary: Negative for dysuria, frequency, urgency and vaginal bleeding.   Musculoskeletal: Positive for back pain. Negative for myalgias and neck pain.   Skin: Negative for color change and rash.   Allergic/Immunologic: Negative for immunocompromised state.   Neurological: Negative for dizziness, seizures, syncope, weakness, light-headedness and headaches.   Hematological: Negative for adenopathy. Does not bruise/bleed easily.   Psychiatric/Behavioral: Negative for behavioral problems and confusion.   All other systems reviewed and are negative.      Past Medical History:   Diagnosis Date   • Allergic asthma     IgE mediated   • Allergic conjunctivitis    •  Allergic rhinitis    • Allergic rhinitis due to pollen    • Astigmatism    • Chlamydia    • Common cold    • Conjunctivitis    • Cough    • Depression    • Diarrhea    • Disorder of skin    • Dysfunction of eustachian tube    • Dysmenorrhea    • Exanthematous disorder    • Food poisoning    • Headache    • Herpes    • Hyperglycemia     mom says she is borderline diabetic   • Ingrowing nail    • Insect bite     nonvenomous   • Intrinsic asthma without status asthmaticus    • Irregular periods    • Knee pain     patellofemoral syndrome   • Migraine    • Nausea and vomiting    • Need for immunization against influenza    • Otalgia, right ear    • Pain in right arm     bruising right forearm   • Pain in throat    • Pain in toe    • Pharyngitis    • Rhinorrhea     posterior   • Rib pain     actually intercostal spasm   • Substance abuse (CMS/HCC)    • Tick bite     history of tick in right ear with abrasion of the ear canal   • Upper respiratory infection    • Wheezing        Allergies   Allergen Reactions   • Naprosyn [Naproxen] Palpitations     twitching       Past Surgical History:   Procedure Laterality Date   • NO PAST SURGERIES     • OTHER SURGICAL HISTORY  01/20/2015    avulsion of nail plate       Family History   Problem Relation Age of Onset   • Ovarian cysts Mother    • Anemia Mother    • Diabetes Mother    • Other Brother         arthrogryposis   • Alzheimer's disease Other    • Asthma Other    • ADD / ADHD Other    • Bipolar disorder Other    • Breast cancer Other    • Depression Other    • Diabetes Other    • Hyperlipidemia Other    • Hypertension Other    • Lung cancer Other    • Mental illness Other    • Ovarian cancer Other    • Rheum arthritis Other    • Stroke Other    • Other Other         toxemia of pregnancy   • Psoriasis Other    • Samayoa-Kendall syndrome Other    • Diabetes Father    • Hypertension Father    • No Known Problems Paternal Grandfather    • Heart disease Paternal Grandmother    •  Hypertension Paternal Grandmother    • Hypertension Maternal Grandmother    • Diabetes Maternal Grandmother    • Hyperthyroidism Maternal Grandmother    • Heart attack Maternal Grandmother    • Samayoa-Kendall syndrome Maternal Grandmother        Social History     Socioeconomic History   • Marital status: Single     Spouse name: Not on file   • Number of children: Not on file   • Years of education: Not on file   • Highest education level: Not on file   Tobacco Use   • Smoking status: Current Every Day Smoker     Packs/day: 0.50     Types: Cigarettes   • Smokeless tobacco: Never Used   Substance and Sexual Activity   • Alcohol use: No   • Drug use: No   • Sexual activity: Yes     Partners: Male     Comment: last pap smear 7/1/19 negative           Objective   Physical Exam   Constitutional: She is oriented to person, place, and time. She appears well-developed and well-nourished.   HENT:   Head: Normocephalic and atraumatic.   Nose: Nose normal.   Mouth/Throat: Oropharynx is clear and moist.   Eyes: Pupils are equal, round, and reactive to light. Conjunctivae and EOM are normal. Right eye exhibits no discharge. Left eye exhibits no discharge. No scleral icterus.   Neck: Normal range of motion. Neck supple. No tracheal deviation present.   Cardiovascular: Normal rate, regular rhythm and normal heart sounds.   No murmur heard.  Pulmonary/Chest: Effort normal and breath sounds normal. No stridor. No respiratory distress. She has no wheezes. She has no rales.   Abdominal: Soft. Bowel sounds are normal. She exhibits no distension and no mass. There is no tenderness. There is no rebound and no guarding.   Musculoskeletal: She exhibits no edema.        Back:    Neurological: She is alert and oriented to person, place, and time. Coordination normal.   Skin: Skin is warm and dry. No rash noted. No erythema.   Psychiatric: She has a normal mood and affect. Her behavior is normal. Thought content normal.   Nursing note and  vitals reviewed.      Procedures           ED Course             Labs Reviewed   URINALYSIS W/ CULTURE IF INDICATED - Abnormal; Notable for the following components:       Result Value    Appearance, UA Cloudy (*)     Leuk Esterase, UA Small (1+) (*)     All other components within normal limits   URINALYSIS, MICROSCOPIC ONLY - Abnormal; Notable for the following components:    RBC, UA 0-2 (*)     Bacteria, UA 1+ (*)     Squamous Epithelial Cells, UA 6-12 (*)     All other components within normal limits   LIPASE - Normal   CK - Normal   MAGNESIUM - Normal   CBC WITH AUTO DIFFERENTIAL - Normal   COMPREHENSIVE METABOLIC PANEL    Narrative:     GFR Normal >60  Chronic Kidney Disease <60  Kidney Failure <15     HCG, QUANTITATIVE, PREGNANCY    Narrative:     HCG Ranges by Gestational Age    3 Weeks         5.8 -    71.2 mIU/mL  4 Weeks         9.5 -     750 mIU/mL  5 Weeks         217 -   7,138 mIU/mL  6 Weeks         158 -  31,795 mIU/mL  7 Weeks       3,697 - 163,563 mIU/mL  8 Weeks      32,065 - 149,571 mIU/mL  9 Weeks      63,803 - 151,410 mIU/mL  10 Weeks     46,509 - 186,977 mIU/mL  12 Weeks     27,832 - 210,612 mIU/mL  14 Weeks     13,950 -  62,530 mIU/mL  15 Weeks     12,039 -  70,971 mIU/mL  16 Weeks      9,040 -  56,451 mIU/mL  17 Weeks      8,175 -  55,868 mIU/mL  18 Weeks      8,099 -  58,176 mIU/mL   CBC AND DIFFERENTIAL    Narrative:     The following orders were created for panel order CBC & Differential.  Procedure                               Abnormality         Status                     ---------                               -----------         ------                     CBC Auto Differential[[id number]]        Normal              Final result                 Please view results for these tests on the individual orders.   EXTRA TUBES    Narrative:     The following orders were created for panel order Extra Tubes.  Procedure                               Abnormality         Status                      ---------                               -----------         ------                     Light Blue Top[[id number]]                                   Final result               Lavender Top[[id number]]                                     Final result                 Please view results for these tests on the individual orders.   LIGHT BLUE TOP   LAVENDER TOP       No orders to display                  No data recorded                        MDM    Final diagnoses:   Less than 8 weeks gestation of pregnancy   Acute cystitis without hematuria   Flank pain              Jacques Sosa MD  12/21/19 0537

## 2020-01-06 ENCOUNTER — LAB (OUTPATIENT)
Dept: LAB | Facility: HOSPITAL | Age: 20
End: 2020-01-06

## 2020-01-06 ENCOUNTER — ROUTINE PRENATAL (OUTPATIENT)
Dept: OBSTETRICS AND GYNECOLOGY | Facility: CLINIC | Age: 20
End: 2020-01-06

## 2020-01-06 ENCOUNTER — TELEPHONE (OUTPATIENT)
Dept: OBSTETRICS AND GYNECOLOGY | Facility: CLINIC | Age: 20
End: 2020-01-06

## 2020-01-06 VITALS — SYSTOLIC BLOOD PRESSURE: 118 MMHG | WEIGHT: 230 LBS | BODY MASS INDEX: 44.92 KG/M2 | DIASTOLIC BLOOD PRESSURE: 66 MMHG

## 2020-01-06 DIAGNOSIS — O36.80X0 ENCOUNTER TO DETERMINE FETAL VIABILITY OF PREGNANCY, SINGLE OR UNSPECIFIED FETUS: Primary | ICD-10-CM

## 2020-01-06 DIAGNOSIS — O36.80X0 ENCOUNTER TO DETERMINE FETAL VIABILITY OF PREGNANCY, SINGLE OR UNSPECIFIED FETUS: ICD-10-CM

## 2020-01-06 LAB — HCG INTACT+B SERPL-ACNC: 8060 MIU/ML

## 2020-01-06 PROCEDURE — 84702 CHORIONIC GONADOTROPIN TEST: CPT

## 2020-01-06 PROCEDURE — 36415 COLL VENOUS BLD VENIPUNCTURE: CPT

## 2020-01-06 PROCEDURE — 99213 OFFICE O/P EST LOW 20 MIN: CPT | Performed by: NURSE PRACTITIONER

## 2020-01-06 NOTE — PROGRESS NOTES
CC: Prenatal visit    Ruth Mancini is a 19 y.o.  at Unknown.  Doing well.  No complaints.  Patient reports she was treated for urinary tract infection in ED a couple of weeks ago and she is doing much better.  Denies cramping, dysuria, or vaginal bleeding.      /66   Wt 104 kg (230 lb)   LMP 10/28/2019 (Approximate)   BMI 44.92 kg/m²     Reviewed prelim dating scan with pt and her mother- intrauterine gestational sac and yolk sac visualized, no fetal pole seen, CL 3.17 cm, uterus wnl, bilateral ovaries wnl, no fluid seen in cul de sac     We discussed SAB precautions.  HCG quant level today, then repeat on Wednesday.             Problems (from 17 to present)     Problem Noted Resolved    Maternal morbid obesity in first trimester, antepartum (CMS/HCC) 2019 by Misti Mondragon APRN No    Anxiety 2019 by Misti Mondragon APRN No    History of depression 2019 by Misti Mondragon APRN No    Maternal tobacco use in first trimester 2019 by Misti Mondragon APRN No    Primigravida in first trimester 2019 by Misti Mondragon APRN No          A/P: Ruth Mancini is a 19 y.o.  at Unknown.  - RTC in 1 weeks for TVUS viability scan or sooner if needed      Diagnosis Plan   1. Encounter to determine fetal viability of pregnancy, single or unspecified fetus  US Ob Transvaginal    hCG, Quantitative, Pregnancy       JOSTIN Field  2020  1:24 PM

## 2020-01-08 ENCOUNTER — LAB (OUTPATIENT)
Dept: LAB | Facility: HOSPITAL | Age: 20
End: 2020-01-08

## 2020-01-08 DIAGNOSIS — O36.80X0 ENCOUNTER TO DETERMINE FETAL VIABILITY OF PREGNANCY, SINGLE OR UNSPECIFIED FETUS: ICD-10-CM

## 2020-01-08 LAB — HCG INTACT+B SERPL-ACNC: 6264 MIU/ML

## 2020-01-08 PROCEDURE — 84702 CHORIONIC GONADOTROPIN TEST: CPT

## 2020-01-08 PROCEDURE — 36415 COLL VENOUS BLD VENIPUNCTURE: CPT

## 2020-01-13 ENCOUNTER — LAB (OUTPATIENT)
Dept: LAB | Facility: HOSPITAL | Age: 20
End: 2020-01-13

## 2020-01-13 ENCOUNTER — ROUTINE PRENATAL (OUTPATIENT)
Dept: OBSTETRICS AND GYNECOLOGY | Facility: CLINIC | Age: 20
End: 2020-01-13

## 2020-01-13 VITALS — DIASTOLIC BLOOD PRESSURE: 62 MMHG | SYSTOLIC BLOOD PRESSURE: 116 MMHG | WEIGHT: 232 LBS | BODY MASS INDEX: 45.31 KG/M2

## 2020-01-13 DIAGNOSIS — E66.01 MATERNAL MORBID OBESITY IN FIRST TRIMESTER, ANTEPARTUM (HCC): ICD-10-CM

## 2020-01-13 DIAGNOSIS — O99.211 MATERNAL MORBID OBESITY IN FIRST TRIMESTER, ANTEPARTUM (HCC): ICD-10-CM

## 2020-01-13 DIAGNOSIS — Z34.01 PRIMIGRAVIDA IN FIRST TRIMESTER: ICD-10-CM

## 2020-01-13 DIAGNOSIS — Z86.59 HISTORY OF DEPRESSION: ICD-10-CM

## 2020-01-13 DIAGNOSIS — O20.0 BLOODY SHOW AND CRAMPING IN EARLY PREGNANCY: Primary | ICD-10-CM

## 2020-01-13 DIAGNOSIS — O20.0 BLOODY SHOW AND CRAMPING IN EARLY PREGNANCY: ICD-10-CM

## 2020-01-13 DIAGNOSIS — O99.331 MATERNAL TOBACCO USE IN FIRST TRIMESTER: ICD-10-CM

## 2020-01-13 DIAGNOSIS — F41.9 ANXIETY: ICD-10-CM

## 2020-01-13 LAB — HCG INTACT+B SERPL-ACNC: 2270 MIU/ML

## 2020-01-13 PROCEDURE — 99213 OFFICE O/P EST LOW 20 MIN: CPT | Performed by: NURSE PRACTITIONER

## 2020-01-13 PROCEDURE — 84702 CHORIONIC GONADOTROPIN TEST: CPT

## 2020-01-13 PROCEDURE — 36415 COLL VENOUS BLD VENIPUNCTURE: CPT

## 2020-01-13 NOTE — PROGRESS NOTES
"CC: Prenatal visit    Ruth Mancini is a 19 y.o.  at Unknown.  Patient reports, \"I just want to know am I pregnant or not\"?  Since last week she has been experiencing vaginal bleeding lighter than her menses with passage of small clots.  We discussed signs of a viable pregnancy and the need to follow hcg quant levels.      Reviewed today's prelim scan report with pt- single intrauterine pregnancy, yolk sac visualized, fetal pole visualized, CRL 6w1d, CL 3 cm, bilateral ovaries wnl    /62   Wt 105 kg (232 lb)   LMP 10/28/2019 (Approximate)   BMI 45.31 kg/m²              Problems (from 17 to present)     Problem Noted Resolved    Maternal morbid obesity in first trimester, antepartum (CMS/HCC) 2019 by Misti Mondragon APRN No    Anxiety 2019 by Misti Mondragon APRN No    History of depression 2019 by Misti Mondragon APRN No    Maternal tobacco use in first trimester 2019 by Misti Mondragon APRN No    Primigravida in first trimester 2019 by Misti Mondragon APRN No          A/P: Ruth Mancini is a 19 y.o.  at Unknown.  HCG quant level today, will follow up with results.       Diagnosis Plan   1. Bloody show and cramping in early pregnancy  hCG, Quantitative, Pregnancy   2. Maternal morbid obesity in first trimester, antepartum (CMS/HCC)     3. Anxiety     4. History of depression     5. Maternal tobacco use in first trimester     6. Primigravida in first trimester         JOSTIN Field  2020  2:26 PM    "

## 2020-01-14 ENCOUNTER — TELEPHONE (OUTPATIENT)
Dept: OBSTETRICS AND GYNECOLOGY | Facility: CLINIC | Age: 20
End: 2020-01-14

## 2020-01-14 DIAGNOSIS — Z36.87 UNSURE OF LMP (LAST MENSTRUAL PERIOD) AS REASON FOR ULTRASOUND SCAN: ICD-10-CM

## 2020-01-14 DIAGNOSIS — N93.9 VAGINAL BLEEDING: Primary | ICD-10-CM

## 2020-01-14 DIAGNOSIS — O99.331 TOBACCO SMOKING COMPLICATING PREGNANCY IN FIRST TRIMESTER: Primary | ICD-10-CM

## 2020-01-14 DIAGNOSIS — Z3A.00 WEEKS OF GESTATION OF PREGNANCY NOT SPECIFIED: ICD-10-CM

## 2020-01-15 ENCOUNTER — TELEPHONE (OUTPATIENT)
Dept: OBSTETRICS AND GYNECOLOGY | Facility: CLINIC | Age: 20
End: 2020-01-15

## 2020-01-15 DIAGNOSIS — O99.331 TOBACCO SMOKING COMPLICATING PREGNANCY IN FIRST TRIMESTER: ICD-10-CM

## 2020-01-15 DIAGNOSIS — Z3A.00 WEEKS OF GESTATION OF PREGNANCY NOT SPECIFIED: ICD-10-CM

## 2020-01-15 DIAGNOSIS — Z36.87 UNSURE OF LMP (LAST MENSTRUAL PERIOD) AS REASON FOR ULTRASOUND SCAN: ICD-10-CM

## 2020-01-15 NOTE — TELEPHONE ENCOUNTER
Pt called and stated she needs work excuses for missing work due to a miscarriage.  Please return her call.  Thanks

## 2020-01-16 ENCOUNTER — TELEPHONE (OUTPATIENT)
Dept: OBSTETRICS AND GYNECOLOGY | Facility: CLINIC | Age: 20
End: 2020-01-16

## 2020-01-20 DIAGNOSIS — O36.80X0 ENCOUNTER TO DETERMINE FETAL VIABILITY OF PREGNANCY, SINGLE OR UNSPECIFIED FETUS: ICD-10-CM

## 2020-02-07 ENCOUNTER — APPOINTMENT (OUTPATIENT)
Dept: GENERAL RADIOLOGY | Facility: HOSPITAL | Age: 20
End: 2020-02-07

## 2020-02-07 ENCOUNTER — HOSPITAL ENCOUNTER (EMERGENCY)
Facility: HOSPITAL | Age: 20
Discharge: HOME | End: 2020-02-07
Attending: EMERGENCY MEDICINE | Admitting: EMERGENCY MEDICINE

## 2020-02-07 VITALS
RESPIRATION RATE: 20 BRPM | DIASTOLIC BLOOD PRESSURE: 75 MMHG | TEMPERATURE: 98.7 F | WEIGHT: 220 LBS | HEART RATE: 78 BPM | OXYGEN SATURATION: 95 % | HEIGHT: 60 IN | BODY MASS INDEX: 43.19 KG/M2 | SYSTOLIC BLOOD PRESSURE: 109 MMHG

## 2020-02-07 DIAGNOSIS — R07.89 CHEST WALL PAIN: ICD-10-CM

## 2020-02-07 DIAGNOSIS — J20.9 ACUTE BRONCHITIS, UNSPECIFIED ORGANISM: Primary | ICD-10-CM

## 2020-02-07 LAB
B-HCG UR QL: NEGATIVE
BACTERIA UR QL AUTO: ABNORMAL /HPF
BILIRUB UR QL STRIP: ABNORMAL
CLARITY UR: ABNORMAL
COLOR UR: ABNORMAL
FLUAV AG NPH QL: NEGATIVE
FLUBV AG NPH QL IA: NEGATIVE
GLUCOSE UR STRIP-MCNC: NEGATIVE MG/DL
HGB UR QL STRIP.AUTO: NEGATIVE
HYALINE CASTS UR QL AUTO: ABNORMAL /LPF
KETONES UR QL STRIP: ABNORMAL
LEUKOCYTE ESTERASE UR QL STRIP.AUTO: NEGATIVE
NITRITE UR QL STRIP: NEGATIVE
PH UR STRIP.AUTO: <=5 [PH] (ref 5–9)
PROT UR QL STRIP: ABNORMAL
RBC # UR: ABNORMAL /HPF
REF LAB TEST METHOD: ABNORMAL
S PYO AG THROAT QL: NEGATIVE
SP GR UR STRIP: 1.04 (ref 1–1.03)
SQUAMOUS #/AREA URNS HPF: ABNORMAL /HPF
UROBILINOGEN UR QL STRIP: ABNORMAL
WBC UR QL AUTO: ABNORMAL /HPF

## 2020-02-07 PROCEDURE — 87880 STREP A ASSAY W/OPTIC: CPT | Performed by: EMERGENCY MEDICINE

## 2020-02-07 PROCEDURE — 93005 ELECTROCARDIOGRAM TRACING: CPT | Performed by: EMERGENCY MEDICINE

## 2020-02-07 PROCEDURE — 99284 EMERGENCY DEPT VISIT MOD MDM: CPT

## 2020-02-07 PROCEDURE — 81001 URINALYSIS AUTO W/SCOPE: CPT | Performed by: EMERGENCY MEDICINE

## 2020-02-07 PROCEDURE — 87081 CULTURE SCREEN ONLY: CPT | Performed by: EMERGENCY MEDICINE

## 2020-02-07 PROCEDURE — 93005 ELECTROCARDIOGRAM TRACING: CPT

## 2020-02-07 PROCEDURE — 81025 URINE PREGNANCY TEST: CPT | Performed by: EMERGENCY MEDICINE

## 2020-02-07 PROCEDURE — 93010 ELECTROCARDIOGRAM REPORT: CPT | Performed by: INTERNAL MEDICINE

## 2020-02-07 PROCEDURE — 71046 X-RAY EXAM CHEST 2 VIEWS: CPT

## 2020-02-07 PROCEDURE — 87804 INFLUENZA ASSAY W/OPTIC: CPT | Performed by: EMERGENCY MEDICINE

## 2020-02-07 RX ORDER — BENZONATATE 100 MG/1
100 CAPSULE ORAL 3 TIMES DAILY PRN
Qty: 15 CAPSULE | Refills: 0 | Status: SHIPPED | OUTPATIENT
Start: 2020-02-07 | End: 2020-06-17

## 2020-02-07 RX ORDER — BENZONATATE 100 MG/1
100 CAPSULE ORAL ONCE
Status: COMPLETED | OUTPATIENT
Start: 2020-02-07 | End: 2020-02-07

## 2020-02-07 RX ORDER — ACETAMINOPHEN 500 MG
1000 TABLET ORAL ONCE
Status: COMPLETED | OUTPATIENT
Start: 2020-02-07 | End: 2020-02-07

## 2020-02-07 RX ORDER — ONDANSETRON 4 MG/1
4 TABLET, ORALLY DISINTEGRATING ORAL ONCE
Status: COMPLETED | OUTPATIENT
Start: 2020-02-07 | End: 2020-02-07

## 2020-02-07 RX ADMIN — BENZONATATE 100 MG: 100 CAPSULE ORAL at 01:58

## 2020-02-07 RX ADMIN — ACETAMINOPHEN 1000 MG: 500 TABLET ORAL at 01:58

## 2020-02-07 RX ADMIN — ONDANSETRON 4 MG: 4 TABLET, ORALLY DISINTEGRATING ORAL at 01:58

## 2020-02-07 NOTE — ED NOTES
Pt presents to the ED with c/o midsternal chest pain. Pt reports a frequent dry cough, fever, and chills. Symptoms started yesterday.           Mallorie Watts RN  02/07/20 0144

## 2020-02-07 NOTE — ED PROVIDER NOTES
Subjective   19-year-old white female presents to the emergency department chief complaint of chest pain.  Patient relates she's been ill since yesterday with nonproductive cough, chest pain that is worsened by coughing, fever, chills, headache, and body aches.          Review of Systems   Constitutional: Positive for chills and fever. Negative for diaphoresis.   Respiratory: Positive for cough. Negative for shortness of breath.    Cardiovascular: Positive for chest pain. Negative for leg swelling.   Gastrointestinal: Positive for nausea. Negative for abdominal pain and vomiting.   Genitourinary: Negative for dysuria.   Musculoskeletal: Positive for arthralgias and myalgias. Negative for gait problem and neck stiffness.   Neurological: Positive for headaches. Negative for syncope and weakness.   All other systems reviewed and are negative.      Past Medical History:   Diagnosis Date   • Allergic asthma     IgE mediated   • Allergic conjunctivitis    • Allergic rhinitis    • Allergic rhinitis due to pollen    • Astigmatism    • Chlamydia    • Common cold    • Conjunctivitis    • Cough    • Depression    • Diarrhea    • Disorder of skin    • Dysfunction of eustachian tube    • Dysmenorrhea    • Exanthematous disorder    • Food poisoning    • Headache    • Herpes    • Hyperglycemia     mom says she is borderline diabetic   • Ingrowing nail    • Insect bite     nonvenomous   • Intrinsic asthma without status asthmaticus    • Irregular periods    • Knee pain     patellofemoral syndrome   • Migraine    • Nausea and vomiting    • Need for immunization against influenza    • Otalgia, right ear    • Pain in right arm     bruising right forearm   • Pain in throat    • Pain in toe    • Pharyngitis    • Rhinorrhea     posterior   • Rib pain     actually intercostal spasm   • Substance abuse (CMS/HCC)    • Tick bite     history of tick in right ear with abrasion of the ear canal   • Upper respiratory infection    • Wheezing         Allergies   Allergen Reactions   • Naprosyn [Naproxen] Palpitations     twitching       Past Surgical History:   Procedure Laterality Date   • NO PAST SURGERIES     • OTHER SURGICAL HISTORY  01/20/2015    avulsion of nail plate       Family History   Problem Relation Age of Onset   • Ovarian cysts Mother    • Anemia Mother    • Diabetes Mother    • Other Brother         arthrogryposis   • Alzheimer's disease Other    • Asthma Other    • ADD / ADHD Other    • Bipolar disorder Other    • Breast cancer Other    • Depression Other    • Diabetes Other    • Hyperlipidemia Other    • Hypertension Other    • Lung cancer Other    • Mental illness Other    • Ovarian cancer Other    • Rheum arthritis Other    • Stroke Other    • Other Other         toxemia of pregnancy   • Psoriasis Other    • Samayoa-Kendall syndrome Other    • Diabetes Father    • Hypertension Father    • No Known Problems Paternal Grandfather    • Heart disease Paternal Grandmother    • Hypertension Paternal Grandmother    • Hypertension Maternal Grandmother    • Diabetes Maternal Grandmother    • Hyperthyroidism Maternal Grandmother    • Heart attack Maternal Grandmother    • Samayoa-Kendall syndrome Maternal Grandmother        Social History     Socioeconomic History   • Marital status: Single     Spouse name: Not on file   • Number of children: Not on file   • Years of education: Not on file   • Highest education level: Not on file   Tobacco Use   • Smoking status: Current Every Day Smoker     Packs/day: 0.50     Types: Cigarettes   • Smokeless tobacco: Never Used   Substance and Sexual Activity   • Alcohol use: No   • Drug use: No   • Sexual activity: Yes     Partners: Male     Comment: last pap smear 7/1/19 negative           Objective   Physical Exam   Constitutional: She is oriented to person, place, and time. She appears well-developed and well-nourished. No distress.   HENT:   Head: Normocephalic and atraumatic.   Right Ear: External ear  normal.   Left Ear: External ear normal.   Nose: Nose normal.   Mouth/Throat: Oropharynx is clear and moist. No oropharyngeal exudate.   Eyes: Pupils are equal, round, and reactive to light. Conjunctivae and EOM are normal.   Neck: Normal range of motion. Neck supple.   Cardiovascular: Normal rate, regular rhythm, normal heart sounds and intact distal pulses.   Pulmonary/Chest: Effort normal and breath sounds normal.   Abdominal: Soft. Bowel sounds are normal. She exhibits no distension. There is no tenderness.   Musculoskeletal: Normal range of motion. She exhibits no edema or tenderness.   Neurological: She is alert and oriented to person, place, and time. She exhibits normal muscle tone.   Skin: Skin is warm and dry. No rash noted. She is not diaphoretic.   Psychiatric: She has a normal mood and affect. Her behavior is normal.   Nursing note and vitals reviewed.      ECG 12 Lead    Date/Time: 2/7/2020 1:15 AM  Performed by: Andrea White MD  Authorized by: Andrea White MD   Interpreted by physician  Rhythm: sinus rhythm  Rate: normal  BPM: 88  QRS axis: normal  Conduction: conduction normal  ST Segments: ST segments normal  T Waves: T waves normal  Clinical impression: normal ECG                 ED Course      Labs Reviewed   URINALYSIS W/ MICROSCOPIC IF INDICATED (NO CULTURE) - Abnormal; Notable for the following components:       Result Value    Appearance, UA Turbid (*)     Specific Gravity, UA 1.044 (*)     Ketones, UA Trace (*)     Bilirubin, UA Small (1+) (*)     Protein, UA 30 mg/dL (1+) (*)     All other components within normal limits   URINALYSIS, MICROSCOPIC ONLY - Abnormal; Notable for the following components:    RBC, UA 0-2 (*)     Squamous Epithelial Cells, UA Too Numerous to Count (*)     All other components within normal limits   INFLUENZA ANTIGEN, RAPID - Normal   RAPID STREP A SCREEN - Normal   PREGNANCY, URINE - Normal   BETA HEMOLYTIC STREP CULTURE, THROAT     Xr Chest 2 View    Result  Date: 2/7/2020  Narrative: HISTORY:  cough, chest pain TECHNIQUE: 2 views of the chest were obtained. COMPARISON:  February 11, 2007 INTERPRETATION: The lungs are clear. The heart size is normal. There is no evidence of pleural effusion or pneumothorax. The visualized osseous structures appear intact.     Impression: Negative exam as above. Electronically signed by:  Pradeep Meyer MD  2/7/2020 2:34 AM [facility] Workstation: [id number]                                           [facility]    Final diagnoses:   Acute bronchitis, unspecified organism   Chest wall pain            Andrea White MD  02/07/20 0254

## 2020-02-09 LAB — BACTERIA SPEC AEROBE CULT: NORMAL

## 2020-06-17 ENCOUNTER — INITIAL PRENATAL (OUTPATIENT)
Dept: OBSTETRICS AND GYNECOLOGY | Facility: CLINIC | Age: 20
End: 2020-06-17

## 2020-06-17 ENCOUNTER — LAB (OUTPATIENT)
Dept: LAB | Facility: HOSPITAL | Age: 20
End: 2020-06-17

## 2020-06-17 VITALS — SYSTOLIC BLOOD PRESSURE: 104 MMHG | WEIGHT: 223.4 LBS | BODY MASS INDEX: 43.63 KG/M2 | DIASTOLIC BLOOD PRESSURE: 66 MMHG

## 2020-06-17 DIAGNOSIS — O99.331 MATERNAL TOBACCO USE IN FIRST TRIMESTER: ICD-10-CM

## 2020-06-17 DIAGNOSIS — F12.90 MARIJUANA SMOKER: ICD-10-CM

## 2020-06-17 DIAGNOSIS — O09.41 HIGH RISK MULTIGRAVIDA IN FIRST TRIMESTER: Primary | ICD-10-CM

## 2020-06-17 DIAGNOSIS — O99.211 MATERNAL MORBID OBESITY IN FIRST TRIMESTER, ANTEPARTUM (HCC): ICD-10-CM

## 2020-06-17 DIAGNOSIS — O09.299 HISTORY OF MISCARRIAGE, CURRENTLY PREGNANT, UNSPECIFIED TRIMESTER: ICD-10-CM

## 2020-06-17 DIAGNOSIS — Z32.00 PREGNANCY EXAMINATION OR TEST, PREGNANCY UNCONFIRMED: Primary | ICD-10-CM

## 2020-06-17 DIAGNOSIS — O21.9 NAUSEA AND VOMITING DURING PREGNANCY PRIOR TO 22 WEEKS GESTATION: ICD-10-CM

## 2020-06-17 DIAGNOSIS — E66.01 MORBID OBESITY WITH BMI OF 40.0-44.9, ADULT (HCC): ICD-10-CM

## 2020-06-17 DIAGNOSIS — F12.10 TETRAHYDROCANNABINOL (THC) USE DISORDER, MILD, ABUSE: ICD-10-CM

## 2020-06-17 DIAGNOSIS — E66.01 MATERNAL MORBID OBESITY IN FIRST TRIMESTER, ANTEPARTUM (HCC): ICD-10-CM

## 2020-06-17 PROBLEM — Z86.59 HISTORY OF DEPRESSION: Status: RESOLVED | Noted: 2019-12-16 | Resolved: 2020-06-17

## 2020-06-17 PROBLEM — F41.9 ANXIETY: Status: RESOLVED | Noted: 2019-12-16 | Resolved: 2020-06-17

## 2020-06-17 PROBLEM — Z34.01 PRIMIGRAVIDA IN FIRST TRIMESTER: Status: RESOLVED | Noted: 2019-12-16 | Resolved: 2020-06-17

## 2020-06-17 LAB
ABO GROUP BLD: NORMAL
AMPHET+METHAMPHET UR QL: NEGATIVE
AMPHETAMINES UR QL: NEGATIVE
B-HCG UR QL: POSITIVE
BARBITURATES UR QL SCN: NEGATIVE
BASOPHILS # BLD AUTO: 0.06 10*3/MM3 (ref 0–0.2)
BASOPHILS NFR BLD AUTO: 0.7 % (ref 0–1.5)
BENZODIAZ UR QL SCN: NEGATIVE
BILIRUB UR QL STRIP: NEGATIVE
BLD GP AB SCN SERPL QL: NEGATIVE
BUPRENORPHINE SERPL-MCNC: NEGATIVE NG/ML
CANNABINOIDS SERPL QL: POSITIVE
CLARITY UR: ABNORMAL
COCAINE UR QL: NEGATIVE
COLOR UR: YELLOW
DEPRECATED RDW RBC AUTO: 44.1 FL (ref 37–54)
EOSINOPHIL # BLD AUTO: 0.26 10*3/MM3 (ref 0–0.4)
EOSINOPHIL NFR BLD AUTO: 3.1 % (ref 0.3–6.2)
ERYTHROCYTE [DISTWIDTH] IN BLOOD BY AUTOMATED COUNT: 14.1 % (ref 12.3–15.4)
GLUCOSE UR STRIP-MCNC: NEGATIVE MG/DL
HBV SURFACE AG SERPL QL IA: NORMAL
HCT VFR BLD AUTO: 40.3 % (ref 34–46.6)
HCV AB SER DONR QL: NORMAL
HGB BLD-MCNC: 13.6 G/DL (ref 12–15.9)
HGB UR QL STRIP.AUTO: NEGATIVE
HIV1+2 AB SER QL: NORMAL
IMM GRANULOCYTES # BLD AUTO: 0.02 10*3/MM3 (ref 0–0.05)
IMM GRANULOCYTES NFR BLD AUTO: 0.2 % (ref 0–0.5)
INTERNAL NEGATIVE CONTROL: NEGATIVE
INTERNAL POSITIVE CONTROL: POSITIVE
KETONES UR QL STRIP: NEGATIVE
LEUKOCYTE ESTERASE UR QL STRIP.AUTO: ABNORMAL
LYMPHOCYTES # BLD AUTO: 1.66 10*3/MM3 (ref 0.7–3.1)
LYMPHOCYTES NFR BLD AUTO: 19.5 % (ref 19.6–45.3)
Lab: ABNORMAL
Lab: NORMAL
MCH RBC QN AUTO: 29.1 PG (ref 26.6–33)
MCHC RBC AUTO-ENTMCNC: 33.7 G/DL (ref 31.5–35.7)
MCV RBC AUTO: 86.1 FL (ref 79–97)
METHADONE UR QL SCN: NEGATIVE
MONOCYTES # BLD AUTO: 0.77 10*3/MM3 (ref 0.1–0.9)
MONOCYTES NFR BLD AUTO: 9.1 % (ref 5–12)
NEUTROPHILS # BLD AUTO: 5.73 10*3/MM3 (ref 1.7–7)
NEUTROPHILS NFR BLD AUTO: 67.4 % (ref 42.7–76)
NITRITE UR QL STRIP: NEGATIVE
NRBC BLD AUTO-RTO: 0 /100 WBC (ref 0–0.2)
OPIATES UR QL: NEGATIVE
OXYCODONE UR QL SCN: NEGATIVE
PCP UR QL SCN: NEGATIVE
PH UR STRIP.AUTO: >=9 [PH] (ref 5–8)
PLATELET # BLD AUTO: 276 10*3/MM3 (ref 140–450)
PMV BLD AUTO: 9.8 FL (ref 6–12)
PROPOXYPH UR QL: NEGATIVE
PROT UR QL STRIP: ABNORMAL
RBC # BLD AUTO: 4.68 10*6/MM3 (ref 3.77–5.28)
RH BLD: POSITIVE
RPR SER QL: NORMAL
RUBV IGG SERPL IA-ACNC: POSITIVE
SP GR UR STRIP: 1.02 (ref 1–1.03)
TRICYCLICS UR QL SCN: NEGATIVE
UROBILINOGEN UR QL STRIP: ABNORMAL
WBC NRBC COR # BLD: 8.5 10*3/MM3 (ref 3.4–10.8)

## 2020-06-17 PROCEDURE — 80081 OBSTETRIC PANEL INC HIV TSTG: CPT | Performed by: NURSE PRACTITIONER

## 2020-06-17 PROCEDURE — 99214 OFFICE O/P EST MOD 30 MIN: CPT | Performed by: NURSE PRACTITIONER

## 2020-06-17 PROCEDURE — 81025 URINE PREGNANCY TEST: CPT | Performed by: NURSE PRACTITIONER

## 2020-06-17 PROCEDURE — 87591 N.GONORRHOEAE DNA AMP PROB: CPT | Performed by: NURSE PRACTITIONER

## 2020-06-17 PROCEDURE — 87491 CHLMYD TRACH DNA AMP PROBE: CPT | Performed by: NURSE PRACTITIONER

## 2020-06-17 PROCEDURE — 81003 URINALYSIS AUTO W/O SCOPE: CPT | Performed by: NURSE PRACTITIONER

## 2020-06-17 PROCEDURE — 36415 COLL VENOUS BLD VENIPUNCTURE: CPT

## 2020-06-17 PROCEDURE — 87661 TRICHOMONAS VAGINALIS AMPLIF: CPT | Performed by: NURSE PRACTITIONER

## 2020-06-17 PROCEDURE — 86803 HEPATITIS C AB TEST: CPT | Performed by: NURSE PRACTITIONER

## 2020-06-17 PROCEDURE — 80306 DRUG TEST PRSMV INSTRMNT: CPT | Performed by: NURSE PRACTITIONER

## 2020-06-17 PROCEDURE — 87086 URINE CULTURE/COLONY COUNT: CPT | Performed by: NURSE PRACTITIONER

## 2020-06-17 RX ORDER — LANOLIN ALCOHOL/MO/W.PET/CERES
50 CREAM (GRAM) TOPICAL DAILY
Qty: 30 TABLET | Refills: 1 | Status: SHIPPED | OUTPATIENT
Start: 2020-06-17 | End: 2020-08-31

## 2020-06-17 RX ORDER — PRENATAL VIT NO.126/IRON/FOLIC 28MG-0.8MG
1 TABLET ORAL DAILY
COMMUNITY
End: 2020-10-12

## 2020-06-17 NOTE — PROGRESS NOTES
[facility]  Obstetrics Visit    CHIEF COMPLAINT:  New prenatal visit    HISTORY OF PRESENT ILLNESS:  Ruth Mancini is a 20 y.o. y/o  at Unknown by LMP (Patient's last menstrual period was 2020 (approximate).  This was a planned pregnancy and the patient is supported by her family.  Reports daily nausea with vomiting.  She denies any cramping or vaginal bleeding.  She has started taking a prenatal vitamin.    REVIEW OF SYSTEMS  Review of Systems   Constitutional: Negative for activity change, appetite change, chills, diaphoresis, fatigue, fever, unexpected weight gain and unexpected weight loss.   Respiratory: Negative for chest tightness and shortness of breath.    Cardiovascular: Negative for chest pain and palpitations.   Gastrointestinal: Positive for nausea and vomiting. Negative for abdominal distention, abdominal pain, constipation, diarrhea, rectal pain, GERD and indigestion.   Genitourinary: Negative for amenorrhea, breast discharge, breast lump, breast pain, decreased libido, decreased urine volume, difficulty urinating, dyspareunia, dysuria, flank pain, frequency, genital sores, hematuria, pelvic pain, pelvic pressure, urgency, urinary incontinence, vaginal bleeding, vaginal discharge and vaginal pain.   Musculoskeletal: Negative for myalgias.   Skin: Negative for color change, dry skin and skin lesions.   Neurological: Negative for light-headedness and headache.   Psychiatric/Behavioral: Negative for agitation, dysphoric mood, sleep disturbance, suicidal ideas, depressed mood and stress. The patient is not nervous/anxious.         Midway score: 11  Patient denies any SI/HI.  She has never been on medications for depression in the past.  Pt declines need for antidepressant at this time.         PRENATAL RISK FACTORS   Problems (from 20 to present)     Problem Noted Resolved    High risk multigravida in first trimester 2020 by Misti Mondragon  JOSTIN No    Maternal morbid obesity in first trimester, antepartum (CMS/HCC) 2020 by Misti Mondragon APRN No    Nausea and vomiting during pregnancy prior to 22 weeks gestation 2020 by Misti Mondragon APRN No    Tetrahydrocannabinol (THC) use disorder, mild, abuse 2020 by Misti Mondragon APRN No    Maternal tobacco use in first trimester 2020 by Misti Mondragon APRN No          DATING CRITERIA:  LMP (2020) -- VIPUL 2021      OBSTETRIC HISTORY:  OB History    Para Term  AB Living   2       1     SAB TAB Ectopic Molar Multiple Live Births   1                # Outcome Date GA Lbr Brad/2nd Weight Sex Delivery Anes PTL Lv   2 Current            1 SAB 2020     SAB        GYN HISTORY:  Denies h/o abnormal pap smears  Last pap smear:   Last Completed Pap Smear       Status Date      PAP SMEAR Done 2019 LIQUID-BASED PAP SMEAR, SCREENING        Denies h/o gynecologic surgeries, including biopsies of the cervix    PAST MEDICAL HISTORY:  Past Medical History:   Diagnosis Date   • Allergic asthma     IgE mediated   • Allergic conjunctivitis    • Allergic rhinitis    • Allergic rhinitis due to pollen    • Astigmatism    • Chlamydia    • Common cold    • Conjunctivitis    • Cough    • Depression    • Diarrhea    • Disorder of skin    • Dysfunction of eustachian tube    • Dysmenorrhea    • Exanthematous disorder    • Food poisoning    • Headache    • Herpes    • Hyperglycemia     mom says she is borderline diabetic   • Ingrowing nail    • Insect bite     nonvenomous   • Intrinsic asthma without status asthmaticus    • Irregular periods    • Knee pain     patellofemoral syndrome   • Migraine    • Nausea and vomiting    • Need for immunization against influenza    • Otalgia, right ear    • Pain in right arm     bruising right forearm   • Pain in throat    • Pain in toe    • Pharyngitis    • Rhinorrhea     posterior   • Rib pain      actually intercostal spasm   • Smoker    • Substance abuse (CMS/HCC)    • Tick bite     history of tick in right ear with abrasion of the ear canal   • Upper respiratory infection    • Wheezing      PAST SURGICAL HISTORY:  Past Surgical History:   Procedure Laterality Date   • NO PAST SURGERIES     • OTHER SURGICAL HISTORY  01/20/2015    avulsion of nail plate     FAMILY HISTORY:  Family History   Problem Relation Age of Onset   • Ovarian cysts Mother    • Anemia Mother    • Diabetes Mother    • Other Brother         arthrogryposis   • Alzheimer's disease Other    • Asthma Other    • ADD / ADHD Other    • Bipolar disorder Other    • Breast cancer Other    • Depression Other    • Diabetes Other    • Hyperlipidemia Other    • Hypertension Other    • Lung cancer Other    • Mental illness Other    • Ovarian cancer Other    • Rheum arthritis Other    • Stroke Other    • Other Other         toxemia of pregnancy   • Psoriasis Other    • Samayoa-Kendall syndrome Other    • Diabetes Father    • Hypertension Father    • No Known Problems Paternal Grandfather    • Heart disease Paternal Grandmother    • Hypertension Paternal Grandmother    • Hypertension Maternal Grandmother    • Diabetes Maternal Grandmother    • Hyperthyroidism Maternal Grandmother    • Heart attack Maternal Grandmother    • Samayoa-Kendall syndrome Maternal Grandmother      SOCIAL HISTORY:  Social History     Socioeconomic History   • Marital status: Single     Spouse name: Not on file   • Number of children: Not on file   • Years of education: Not on file   • Highest education level: Not on file   Tobacco Use   • Smoking status: Current Every Day Smoker     Packs/day: 0.25     Types: Cigarettes   • Smokeless tobacco: Never Used   Substance and Sexual Activity   • Alcohol use: Not Currently   • Drug use: Yes     Types: Marijuana   • Sexual activity: Yes     Partners: Male     Comment: last pap smear 7/1/19 negative     GENETIC SCREENING:  Age >34 yo as of  VIPUL: no  Thalassemia: no  NTD: no  CHD: no  Down Syndrome/MR/Fragile X/Autism: no  Ashkenazi Anglican with Beto-Sachs, [place], familial dysautonomia: no  Sickle cell disease or trait: no  Hemophilia: no  Muscular dystrophy: no  Cystic fibrosis: no  Diggs's chorea: no  Birth defects: no  Genetic/chromosomal disorders: no    INFECTION HISTORY:  TB exposure: no  HSV: no  Illness since LMP: no  Prior GBS infected child: no  STIs: yes, chlamydia    ALLERGIES:  Allergies   Allergen Reactions   • Naprosyn [Naproxen] Palpitations     twitching       MEDICATIONS:  Prior to Admission medications    Medication Sig Start Date End Date Taking? Authorizing Provider   doxylamine (UNISOM) 25 MG tablet Take 1 tablet by mouth Every Night. 6/17/20   Misti Mondragon APRN   Prenatal Vit-Fe Fumarate-FA (PRENATAL, CLASSIC, VITAMIN) 28-0.8 MG tablet tablet Take 1 tablet by mouth Daily.    Provider, MD Radha   vitamin B-6 (PYRIDOXINE) 50 MG tablet Take 1 tablet by mouth Daily. 6/17/20   Misti Mondragon APRN       PHYSICAL EXAM:   LMP 04/20/2020 (Approximate)   General: Alert, healthy, no distress, well nourished and well developed.  Neurologic: Alert, oriented to person, place, and time.  Gait normal.  Cranial nerves II-XII grossly intact.  HEENT: Normocephalic, atraumatic.  Extraocular muscles intact, pupils equal and reactive x2.    Teeth: Normal hygiene.  Neck: Supple, no adenopathy, thyroid normal size, non-tender, without nodularity, trachea midline.  Breasts: No masses, skin dimpling, skin retraction, nipple discharge, or asymmetry bilaterally.  Lungs: Normal respiratory effort.  Clear to auscultation bilaterally.  No wheezes, rhonci, or rales.  Heart: Regular rate and rhythm.  No murmer, rub or gallop.  Abdomen: Soft, non-tender, non-distended,no masses, no hepatosplenomegaly, no hernia.  Skin: No rash, no lesions.  Extremities: No cyanosis, clubbing or edema.    Bedside ultrasound performed by  myself which shows the findings below: +HHUS      IMPRESSION:  Ruth Mancini is a 20 y.o.  at Unknown for a new prenatal visit.    PLAN:  1.  NOB  - Options counseling performed and patient desires continuation of pregnancy to term   - Prenatal labs ordered  - Genetic testing, including cystic fibrosis, was discussed and patient declines  - Continue prenatal vitamins  - Weight gain counseling performed.   - Pregravid BMI >30: Recommend 11-20 lb  - Return to clinic in 2 weeks for return prenatal visit, dating TVUS, and labs  - Reviewed COVID-19 visitation policy  - Reviewed COVID-19 precautions     Diagnosis Plan   1. High risk multigravida in first trimester     2. Maternal morbid obesity in first trimester, antepartum (CMS/HCC)     3. Nausea and vomiting during pregnancy prior to 22 weeks gestation  Encouraged small frequent protein snacks, dry crackers before arising in am    vitamin B-6 (PYRIDOXINE) 50 MG tablet    doxylamine (UNISOM) 25 MG tablet   4. Tetrahydrocannabinol (THC) use disorder, mild, abuse  Discussed risk of smoking during pregnancy, encouraged complete smoking cessation   5. Maternal tobacco use in first trimester       JOSTIN Field  2020  15:01

## 2020-06-17 NOTE — PROGRESS NOTES
I spent approximately 60 minutes with the patient acquiring the health and history intake and discussing topics related to healthy lifestyle. This is her 2nd pregnancy. She had a miscarriage in January 2020. Her LMP is approximately 4/20/20.  A newob bag is given. The 1st trimester teaching was done with the patient. We discussed a healthy diet and exercise and what is recommended. I also discussed Listeriosis and Toxoplasmosis and what fish to avoid due to high mercury levels. Informed patient not to be in hot tubs, saunas, or tanning beds. We discussed that spotting may occur after intercourse which is common, but if heavy bleeding like a period occurs to call [facility] or hospital if clinic is closed.  I encouraged her to make an appointment with the dentist if she has not had a dental exam and cleaning in the last 6 months. I instructed the patient that alcohol, illicit drug use, and tobacco smoking should be avoided in pregnancy. The patient smokes about 3-4 cigarettes daily. She also smokes marijuana occasionally. She said she is going to quit. We also discussed the importance of avoiding second hand smoke. We discussed the hospital policy procedure if a patient has a positive urine drug screen at the time of admission to the hospital. She plans to breastfeed. I gave her pamphlet on breastfeeding classes and the breastfeeding mothers support group that is provided by Simi Bucio, Lactation Consultant. We discussed the resources that is offered at the health departments, and we discussed that some health departments have a breastfeeding peer counselor. She filled out the health department referral form and depression screening questionnaire. I informed patient that group prenatal education classes are offered here. I instructed patient to let the provider know if she would like to join a group after EDC is established. A Centering Pregnancy pamphlet is given.  I discussed with the patient that a  pediatrician needs to be chosen prior to delivery for the infant to have an appointment scheduled before leaving the hospital.   I discussed lab tests will be done today. We discussed her doing a 1hr glucola on her return visit. I encouraged the patient to get the TDAP vaccine in the 3rd trimester.  All questions were answered at this time.

## 2020-06-18 LAB
BACTERIA SPEC AEROBE CULT: NORMAL
C TRACH RRNA CVX QL NAA+PROBE: NEGATIVE
HOLD SPECIMEN: NORMAL
N GONORRHOEA RRNA SPEC QL NAA+PROBE: NEGATIVE
TRICHOMONAS VAGINALIS PCR: POSITIVE

## 2020-06-19 ENCOUNTER — TELEPHONE (OUTPATIENT)
Dept: OBSTETRICS AND GYNECOLOGY | Facility: CLINIC | Age: 20
End: 2020-06-19

## 2020-06-19 NOTE — TELEPHONE ENCOUNTER
----- Message from JOSTIN Encarnacion sent at 6/19/2020  8:19 AM CDT -----  +trich we will tx in 2nd trimester  ----- Message -----  From: Zonia Floyd RN  Sent: 6/17/2020   1:56 PM CDT  To: JOSTIN Encarnacion

## 2020-06-29 ENCOUNTER — ROUTINE PRENATAL (OUTPATIENT)
Dept: OBSTETRICS AND GYNECOLOGY | Facility: CLINIC | Age: 20
End: 2020-06-29

## 2020-06-29 ENCOUNTER — LAB (OUTPATIENT)
Dept: LAB | Facility: HOSPITAL | Age: 20
End: 2020-06-29

## 2020-06-29 VITALS — WEIGHT: 223 LBS | SYSTOLIC BLOOD PRESSURE: 102 MMHG | BODY MASS INDEX: 43.55 KG/M2 | DIASTOLIC BLOOD PRESSURE: 66 MMHG

## 2020-06-29 DIAGNOSIS — A59.01 TRICHOMONAL VAGINITIS IN PREGNANCY IN FIRST TRIMESTER: ICD-10-CM

## 2020-06-29 DIAGNOSIS — Z36.87 ENCOUNTER FOR ANTENATAL SCREENING FOR UNCERTAIN DATES: Primary | ICD-10-CM

## 2020-06-29 DIAGNOSIS — O99.211 MATERNAL MORBID OBESITY IN FIRST TRIMESTER, ANTEPARTUM (HCC): ICD-10-CM

## 2020-06-29 DIAGNOSIS — O23.591 TRICHOMONAL VAGINITIS IN PREGNANCY IN FIRST TRIMESTER: ICD-10-CM

## 2020-06-29 DIAGNOSIS — O99.331 MATERNAL TOBACCO USE IN FIRST TRIMESTER: ICD-10-CM

## 2020-06-29 DIAGNOSIS — Z13.79 GENETIC SCREENING: ICD-10-CM

## 2020-06-29 DIAGNOSIS — Z3A.10 10 WEEKS GESTATION OF PREGNANCY: Primary | ICD-10-CM

## 2020-06-29 DIAGNOSIS — O21.9 NAUSEA AND VOMITING DURING PREGNANCY PRIOR TO 22 WEEKS GESTATION: ICD-10-CM

## 2020-06-29 DIAGNOSIS — E66.01 MATERNAL MORBID OBESITY IN FIRST TRIMESTER, ANTEPARTUM (HCC): ICD-10-CM

## 2020-06-29 DIAGNOSIS — F12.10 TETRAHYDROCANNABINOL (THC) USE DISORDER, MILD, ABUSE: ICD-10-CM

## 2020-06-29 DIAGNOSIS — O09.41 HIGH RISK MULTIGRAVIDA IN FIRST TRIMESTER: ICD-10-CM

## 2020-06-29 PROCEDURE — 99213 OFFICE O/P EST LOW 20 MIN: CPT | Performed by: NURSE PRACTITIONER

## 2020-06-29 NOTE — PROGRESS NOTES
CC: Prenatal visit    Ruth Mancini is a 20 y.o.  at 10w0d.  Doing well.  No complaints.  Desires down syndrome screening with gender labs today.  Denies contractions, LOF, or VB.      /66   Wt 101 kg (223 lb)   LMP 2020 (Approximate)   BMI 43.55 kg/m²              Problems (from 20 to present)     Problem Noted Resolved    Trichomonal vaginitis in pregnancy in first trimester 2020 by Misti Mondragon APRN No    High risk multigravida in first trimester 2020 by Misti Mondragon APRN No    Maternal morbid obesity in first trimester, antepartum (CMS/HCC) 2020 by Misti Mondragon APRN No    Nausea and vomiting during pregnancy prior to 22 weeks gestation 2020 by Misti Mondragon APRN No    Tetrahydrocannabinol (THC) use disorder, mild, abuse 2020 by Misti Mondragon APRN No    Maternal tobacco use in first trimester 2020 by Misti Mondragon APRN No          A/P: Ruth Mancini is a 20 y.o.  at 10w0d.  Educated on 1 hour ogtt  - RTC in 4 weeks for SHIRLENE appt or sooner if needed      Diagnosis Plan   1. 10 weeks gestation of pregnancy     2. High risk multigravida in first trimester     3. Maternal morbid obesity in first trimester, antepartum (CMS/HCC)     4. Nausea and vomiting during pregnancy prior to 22 weeks gestation  Pt needs to  meds   5. Tetrahydrocannabinol (THC) use disorder, mild, abuse  Strongly encouraged complete smoking cessation   6. Maternal tobacco use in first trimester     7. Trichomonal vaginitis in pregnancy in first trimester  tx in 2nd trimester         JOSTIN Field  2020  11:01

## 2020-07-06 ENCOUNTER — TELEPHONE (OUTPATIENT)
Dept: OBSTETRICS AND GYNECOLOGY | Facility: CLINIC | Age: 20
End: 2020-07-06

## 2020-07-20 ENCOUNTER — LAB (OUTPATIENT)
Dept: LAB | Facility: HOSPITAL | Age: 20
End: 2020-07-20

## 2020-07-20 DIAGNOSIS — O09.299 HISTORY OF MISCARRIAGE, CURRENTLY PREGNANT, UNSPECIFIED TRIMESTER: ICD-10-CM

## 2020-07-20 DIAGNOSIS — E66.01 MORBID OBESITY WITH BMI OF 40.0-44.9, ADULT (HCC): ICD-10-CM

## 2020-07-20 LAB — GLUCOSE 1H P 100 G GLC PO SERPL-MCNC: 87 MG/DL (ref 60–140)

## 2020-07-20 PROCEDURE — 82950 GLUCOSE TEST: CPT

## 2020-07-20 PROCEDURE — 36415 COLL VENOUS BLD VENIPUNCTURE: CPT

## 2020-07-27 ENCOUNTER — ROUTINE PRENATAL (OUTPATIENT)
Dept: OBSTETRICS AND GYNECOLOGY | Facility: CLINIC | Age: 20
End: 2020-07-27

## 2020-07-27 ENCOUNTER — TELEPHONE (OUTPATIENT)
Dept: OBSTETRICS AND GYNECOLOGY | Facility: CLINIC | Age: 20
End: 2020-07-27

## 2020-07-27 VITALS — DIASTOLIC BLOOD PRESSURE: 68 MMHG | WEIGHT: 223 LBS | SYSTOLIC BLOOD PRESSURE: 110 MMHG | BODY MASS INDEX: 43.55 KG/M2

## 2020-07-27 DIAGNOSIS — M54.50 LOW BACK PAIN DURING PREGNANCY IN SECOND TRIMESTER: ICD-10-CM

## 2020-07-27 DIAGNOSIS — F12.10 TETRAHYDROCANNABINOL (THC) USE DISORDER, MILD, ABUSE: ICD-10-CM

## 2020-07-27 DIAGNOSIS — Z36.89 ENCOUNTER FOR FETAL ANATOMIC SURVEY: ICD-10-CM

## 2020-07-27 DIAGNOSIS — O21.9 NAUSEA AND VOMITING DURING PREGNANCY PRIOR TO 22 WEEKS GESTATION: ICD-10-CM

## 2020-07-27 DIAGNOSIS — A59.01 TRICHOMONAL VAGINITIS DURING PREGNANCY IN SECOND TRIMESTER: ICD-10-CM

## 2020-07-27 DIAGNOSIS — Z3A.14 14 WEEKS GESTATION OF PREGNANCY: Primary | ICD-10-CM

## 2020-07-27 DIAGNOSIS — O23.592 TRICHOMONAL VAGINITIS DURING PREGNANCY IN SECOND TRIMESTER: ICD-10-CM

## 2020-07-27 DIAGNOSIS — E66.01 MATERNAL MORBID OBESITY IN SECOND TRIMESTER, ANTEPARTUM (HCC): ICD-10-CM

## 2020-07-27 DIAGNOSIS — O09.42 HIGH RISK MULTIGRAVIDA IN SECOND TRIMESTER: ICD-10-CM

## 2020-07-27 DIAGNOSIS — O99.332 MATERNAL TOBACCO USE IN SECOND TRIMESTER: ICD-10-CM

## 2020-07-27 DIAGNOSIS — O99.212 MATERNAL MORBID OBESITY IN SECOND TRIMESTER, ANTEPARTUM (HCC): ICD-10-CM

## 2020-07-27 DIAGNOSIS — O26.892 LOW BACK PAIN DURING PREGNANCY IN SECOND TRIMESTER: ICD-10-CM

## 2020-07-27 PROCEDURE — 99213 OFFICE O/P EST LOW 20 MIN: CPT | Performed by: NURSE PRACTITIONER

## 2020-07-27 RX ORDER — METRONIDAZOLE 500 MG/1
2000 TABLET ORAL ONCE
Qty: 4 TABLET | Refills: 0 | Status: SHIPPED | OUTPATIENT
Start: 2020-07-27 | End: 2020-07-27

## 2020-07-27 NOTE — PROGRESS NOTES
CC: Prenatal visit    Ruth Mancini is a 20 y.o.  at 14w0d.  Doing well.  She complains of mid low back pain for the past week.  The back pain is worsened when she lays on her back.  Denies contractions, LOF, or VB.      /68   Wt 101 kg (223 lb)   LMP 2020 (Approximate)   BMI 43.55 kg/m²              Problems (from 20 to present)     Problem Noted Resolved    Trichomonal vaginitis during pregnancy in second trimester 2020 by Misti Mondragon APRN No    High risk multigravida in second trimester 2020 by Misti Mondragon APRN No    Maternal morbid obesity in second trimester, antepartum (CMS/HCC) 2020 by Misti Mondragon APRN No    Nausea and vomiting during pregnancy prior to 22 weeks gestation 2020 by Misti Mondragon APRN No    Tetrahydrocannabinol (THC) use disorder, mild, abuse 2020 by Misti Mondragon, JOSTIN No    Maternal tobacco use in second trimester 2020 by Misti Mondragon APRN No          A/P: Ruth Mancini is a 20 y.o.  at 14w0d.  - RTC in 5 weeks for SHIRLENE appt and anatomy scan or sooner if needed     Diagnosis Plan   1. 14 weeks gestation of pregnancy     2. High risk multigravida in second trimester     3. Trichomonal vaginitis during pregnancy in second trimester  metroNIDAZOLE (Flagyl) 500 MG tablet    Chlamydia trachomatis, Neisseria gonorrhoeae, Trichomonas vaginalis, PCR - Urine, Urine, Clean Catch   4. Maternal morbid obesity in second trimester, antepartum (CMS/HCC)     5. Nausea and vomiting during pregnancy prior to 22 weeks gestation     6. Tetrahydrocannabinol (THC) use disorder, mild, abuse     7. Maternal tobacco use in second trimester     8. Low back pain during pregnancy in second trimester  Ambulatory Referral to Physical Therapy Evaluate and treat   9. Encounter for fetal anatomic survey  US Ob 14 + Weeks Single or First Gestation        JOSTIN Field  7/27/2020  14:13

## 2020-08-31 ENCOUNTER — LAB (OUTPATIENT)
Dept: LAB | Facility: HOSPITAL | Age: 20
End: 2020-08-31

## 2020-08-31 ENCOUNTER — ROUTINE PRENATAL (OUTPATIENT)
Dept: OBSTETRICS AND GYNECOLOGY | Facility: CLINIC | Age: 20
End: 2020-08-31

## 2020-08-31 VITALS — SYSTOLIC BLOOD PRESSURE: 112 MMHG | WEIGHT: 230.6 LBS | DIASTOLIC BLOOD PRESSURE: 74 MMHG | BODY MASS INDEX: 45.04 KG/M2

## 2020-08-31 DIAGNOSIS — Z36.2 ENCOUNTER FOR OTHER ANTENATAL SCREENING FOLLOW-UP: ICD-10-CM

## 2020-08-31 DIAGNOSIS — O99.891 BACK PAIN AFFECTING PREGNANCY IN SECOND TRIMESTER: ICD-10-CM

## 2020-08-31 DIAGNOSIS — A59.01 TRICHOMONAL VAGINITIS DURING PREGNANCY IN SECOND TRIMESTER: ICD-10-CM

## 2020-08-31 DIAGNOSIS — Z3A.19 19 WEEKS GESTATION OF PREGNANCY: ICD-10-CM

## 2020-08-31 DIAGNOSIS — M54.9 BACK PAIN AFFECTING PREGNANCY IN SECOND TRIMESTER: ICD-10-CM

## 2020-08-31 DIAGNOSIS — A59.01 TRICHOMONAL VAGINITIS DURING PREGNANCY IN SECOND TRIMESTER: Primary | ICD-10-CM

## 2020-08-31 DIAGNOSIS — O21.9 NAUSEA AND VOMITING DURING PREGNANCY PRIOR TO 22 WEEKS GESTATION: ICD-10-CM

## 2020-08-31 DIAGNOSIS — O99.212 MATERNAL MORBID OBESITY IN SECOND TRIMESTER, ANTEPARTUM (HCC): ICD-10-CM

## 2020-08-31 DIAGNOSIS — F12.10 TETRAHYDROCANNABINOL (THC) USE DISORDER, MILD, ABUSE: ICD-10-CM

## 2020-08-31 DIAGNOSIS — E66.01 MATERNAL MORBID OBESITY IN SECOND TRIMESTER, ANTEPARTUM (HCC): ICD-10-CM

## 2020-08-31 DIAGNOSIS — O09.42 HIGH RISK MULTIGRAVIDA IN SECOND TRIMESTER: ICD-10-CM

## 2020-08-31 DIAGNOSIS — O99.332 MATERNAL TOBACCO USE IN SECOND TRIMESTER: ICD-10-CM

## 2020-08-31 DIAGNOSIS — O23.592 TRICHOMONAL VAGINITIS DURING PREGNANCY IN SECOND TRIMESTER: Primary | ICD-10-CM

## 2020-08-31 DIAGNOSIS — O23.592 TRICHOMONAL VAGINITIS DURING PREGNANCY IN SECOND TRIMESTER: ICD-10-CM

## 2020-08-31 PROCEDURE — 87491 CHLMYD TRACH DNA AMP PROBE: CPT

## 2020-08-31 PROCEDURE — 87591 N.GONORRHOEAE DNA AMP PROB: CPT

## 2020-08-31 PROCEDURE — 99213 OFFICE O/P EST LOW 20 MIN: CPT | Performed by: OBSTETRICS & GYNECOLOGY

## 2020-08-31 PROCEDURE — 87661 TRICHOMONAS VAGINALIS AMPLIF: CPT

## 2020-08-31 NOTE — PROGRESS NOTES
CC: Prenatal visit    Ruth Mancini is a 20 y.o.  at 19w0d.  Doing well.  Denies contractions, LOF, or VB.  Reports good FM.    /74   Wt 105 kg (230 lb 9.6 oz)   LMP 2020 (Approximate)   BMI 45.04 kg/m²   SVE: Not done     Fetal Heart Rate: 149     Problems (from 20 to present)     Problem Noted Resolved    Trichomonal vaginitis during pregnancy in second trimester 2020 by iMsti Mondragon APRN No    High risk multigravida in second trimester 2020 by Misti Mondragon APRN No    Maternal morbid obesity in second trimester, antepartum (CMS/HCC) 2020 by Misti Mondragon APRN No    Nausea and vomiting during pregnancy prior to 22 weeks gestation 2020 by Misti Mondragon APRN No    Tetrahydrocannabinol (THC) use disorder, mild, abuse 2020 by Misti Mondragon APRN No    Maternal tobacco use in second trimester 2020 by Misti Mondragon APRN No          A/P: Ruth Mancini is a 20 y.o.  at 19w0d.  - RTC in 14 weeks  - Reviewed COVID-19 visitation policy  - Reviewed COVID-19 precautions     Diagnosis Plan   1. Trichomonal vaginitis during pregnancy in second trimester  CT/  NG, TV, PCR - Urine, Urine, Random Void test of cure   2. Maternal morbid obesity in second trimester, antepartum (CMS/HCC)     3. Nausea and vomiting during pregnancy prior to 22 weeks gestation     4. High risk multigravida in second trimester     5. Tetrahydrocannabinol (THC) use disorder, mild, abuse     6. Maternal tobacco use in second trimester     7. Encounter for other  screening follow-up  US Ob Follow Up Transabdominal Approach   8. 19 weeks gestation of pregnancy     Patient's ultrasound is reviewed with her there is suboptimal views.  We are going to plan for repeat ultrasound in 4 weeks.  She is also complaining of back pain will refer to physical therapy to evaluate and treat  Taz  DEMARIO Francois MD  8/31/2020  18:21

## 2020-09-02 LAB
C TRACH RRNA CVX QL NAA+PROBE: NOT DETECTED
N GONORRHOEA RRNA SPEC QL NAA+PROBE: NOT DETECTED
TRICHOMONAS VAGINALIS PCR: NOT DETECTED

## 2020-09-04 ENCOUNTER — TELEPHONE (OUTPATIENT)
Dept: OBSTETRICS AND GYNECOLOGY | Facility: CLINIC | Age: 20
End: 2020-09-04

## 2020-09-04 RX ORDER — PNV NO.95/FERROUS FUM/FOLIC AC 28MG-0.8MG
1 TABLET ORAL DAILY
Qty: 30 TABLET | Refills: 12 | Status: SHIPPED | OUTPATIENT
Start: 2020-09-04 | End: 2020-10-12

## 2020-09-08 ENCOUNTER — HOSPITAL ENCOUNTER (OUTPATIENT)
Dept: PHYSICAL THERAPY | Facility: HOSPITAL | Age: 20
Setting detail: THERAPIES SERIES
Discharge: HOME | End: 2020-09-08

## 2020-09-08 DIAGNOSIS — M54.9 BACK PAIN AFFECTING PREGNANCY IN SECOND TRIMESTER: Primary | ICD-10-CM

## 2020-09-08 DIAGNOSIS — O99.891 BACK PAIN AFFECTING PREGNANCY IN SECOND TRIMESTER: Primary | ICD-10-CM

## 2020-09-08 PROCEDURE — 97162 PT EVAL MOD COMPLEX 30 MIN: CPT | Performed by: PHYSICAL THERAPIST

## 2020-09-08 PROCEDURE — 97110 THERAPEUTIC EXERCISES: CPT | Performed by: PHYSICAL THERAPIST

## 2020-09-08 NOTE — THERAPY EVALUATION
Outpatient Physical Therapy Pelvic Health Initial Evaluation  [facility]     Patient Name: Ruth Mancini  : 2000  MRN: [id number]  Today's Date: 2020        Visit Date: 2020   Visit number:   Recheck: 20  Insurance: Passport, 20 v per year      Patient Active Problem List   Diagnosis   • High risk multigravida in second trimester   • Maternal morbid obesity in second trimester, antepartum (CMS/[facility])   • Nausea and vomiting during pregnancy prior to 22 weeks gestation   • Tetrahydrocannabinol (THC) use disorder, mild, abuse   • Maternal tobacco use in second trimester   • Trichomonal vaginitis during pregnancy in second trimester        Past Medical History:   Diagnosis Date   • Allergic asthma     IgE mediated   • Allergic conjunctivitis    • Allergic rhinitis    • Allergic rhinitis due to pollen    • Astigmatism    • Chlamydia    • Common cold    • Conjunctivitis    • Cough    • Depression    • Diarrhea    • Disorder of skin    • Dysfunction of eustachian tube    • Dysmenorrhea    • Exanthematous disorder    • Food poisoning    • Headache    • Herpes    • Hyperglycemia     mom says she is borderline diabetic   • Ingrowing nail    • Insect bite     nonvenomous   • Intrinsic asthma without status asthmaticus    • Irregular periods    • Knee pain     patellofemoral syndrome   • Migraine    • Nausea and vomiting    • Need for immunization against influenza    • Otalgia, right ear    • Pain in right arm     bruising right forearm   • Pain in throat    • Pain in toe    • Pharyngitis    • Rhinorrhea     posterior   • Rib pain     actually intercostal spasm   • Smoker    • Substance abuse (CMS/[facility])    • Tick bite     history of tick in right ear with abrasion of the ear canal   • Trichomonal vaginitis in pregnancy in first trimester 2020   • Upper respiratory infection    • Wheezing         Past Surgical History:   Procedure Laterality Date   • NO PAST SURGERIES     • OTHER  SURGICAL HISTORY  2015    avulsion of nail plate     Current Outpatient Medications on File Prior to Encounter   Medication Sig Dispense Refill   • Prenatal Vit-Fe Fumarate-FA (PRENATAL VITAMIN 28-0.8) 28-0.8 MG tablet tablet Take 1 tablet by mouth Daily. 30 tablet 12   • Prenatal Vit-Fe Fumarate-FA (PRENATAL, CLASSIC, VITAMIN) 28-0.8 MG tablet tablet Take 1 tablet by mouth Daily.       No current facility-administered medications on file prior to encounter.          Visit Dx:    ICD-10-CM ICD-9-CM   1. Back pain affecting pregnancy in second trimester O99.89 646.83    M54.9 724.5           Pelvic Health     Row Name 20 1000             Pregnancy Questions    Number of Pregnancies  2  -SW      Number of Miscarriages  1  -SW      Has the patient had an ?  No  -SW      Number of Children  0  -SW      How many weeks pregnant are you?  20 weeks  -SW      Due Date  21  -SW         Pain Assessment    Pain Assessment  0-10  -SW      Pain Score  7  -SW      Post Pain Score  7  -SW      Pain Location  -- mid back location  -        User Key  (r) = Recorded By, (t) = Taken By, (c) = Cosigned By    Initials Name Provider Type    SW Vee Hagan, PT DPT Physical Therapist        PT Ortho     Row Name 20 1000       Subjective Comments    Subjective Comments  First term pregnancy now at 20 weeks gestation with complaints of back pain.  Onset of pain began around 15-16 weeks gestation.  She has tried to modified positions with seated rest but little to no change.  Pain described as achey and sometimes radiates upward the spine vs down the spine.  No numbness and tinglingn associated with complaints.  Never had this prior to pregnancy.  Pain is constant but seems to flux with intensity changing daily and throughout the day.  Worst pain in past 24 hours was 8-9 out of 10.  Best she has felt has been a 5/10.  Stretches have been attempted, but little to no assist.  Openly admits to 2 hits from  [place] to help ease the pain.  Not done daily but when pain intensity increases greatly.  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  BP and lightheadedness experienced with prolonged standing activity.  Most of day is spent sitting for this reason.  Pt admits that she has had higher BP due to anxiety.     -SW       Subjective Pain    Able to rate subjective pain?  yes  -SW    Pre-Treatment Pain Level  7  -SW    Post-Treatment Pain Level  7  -SW       Posture/Observations    Posture- WNL  Posture is WNL  -SW    Posture/Observations Comments  Standing postural assessment showed kyphosis in CT junction.  Otherwise symmetrical at hips and shoulders bilaterally.   -SW       Quarter Clearing    Quarter Clearing  Upper Quarter Clearing;Lower Quarter Clearing  -SW       DTR- Upper Quarter Clearing    Biceps (C5/6)  2- Normal response  -SW    Brachioradialis (C6)  2- Normal response  -SW    Triceps (C7)  2- Normal response  -SW       Sensory Screen for Light Touch- Upper Quarter Clearing    C4 (posterior shoulder)  Intact  -SW    C5 (lateral upper arm)  Intact  -SW    C6 (tip of thumb)  Intact  -SW    C7 (tip of 3rd finger)  Intact  -SW    C8 (tip of 5th finger)  Intact  -SW    T1 (medial lower arm)  Intact  -SW       Myotomal Screen- Upper Quarter Clearing    Shoulder flexion (C5)  5 (Normal)  -SW    Elbow flexion/wrist extension (C6)  5 (Normal)  -SW    Elbow extension/wrist flexion (C7)  5 (Normal)  -SW      WNL  -SW       Cervical/Shoulder ROM Screen    Cervical flexion  Normal inc pain reported  -SW    Cervical extension  Normal  -SW    Cervical lateral flexion  Normal  -SW    Cervical rotation  Normal  -SW    Cervical quadrant (Spurling's)  Normal  -SW    Shoulder elevation   Normal  -SW       DTR- Lower Quarter Clearing    Patellar tendon (L2-4)  2- Normal response  -SW    Achilles tendon (S1-2)  2- Normal response  -SW       Neural Tension Signs- Lower Quarter  Clearing    Slump  Negative  -SW    SLR  Negative  -SW       Sensory Screen for Light Touch- Lower Quarter Clearing    L1 (inguinal area)  Intact  -SW    L2 (anterior mid thigh)  Intact  -SW    L3 (distal anterior thigh)  Intact  -SW    L4 (medial lower leg/foot)  Intact  -SW    L5 (lateral lower leg/great toe)  Intact  -SW    S1 (bottom of foot)  Intact  -SW       Myotomal Screen- Lower Quarter Clearing    Hip flexion (L2)  5 (Normal)  -SW    Knee extension (L3)  5 (Normal)  -SW    Knee flexion (S2)  5 (Normal)  -SW       Lumbar ROM Screen- Lower Quarter Clearing    Lumbar Flexion  Normal  -SW    Lumbar Extension  Normal  -SW    Lumbar Lateral Flexion  Normal  -SW    Lumbar Rotation  Normal  -SW       SI/Hip Screen- Lower Quarter Clearing    ASIS compression  Negative improved s/s with compression  -SW    ASIS distraction  Negative  -SW    Lance's/Neto's test  Right:;Positive  -SW       Special Tests/Palpation    Special Tests/Palpation  Cervical/Thoracic;Lumbar/SI  -SW       Cervical Palpation    Cervical Palpation- Location?  Upper traps  -SW    Upper Traps  -- trigger to R side.   -SW       Thoracic Accessory Motions    Thoracic Accessory Motions Tested?  Yes  -    Pa glide- Middle thoracic  -- audible manipulation with AP glide to C6/7  -       Rib Mobility    Rib Mobility Accessory Motions Tested?  -- slightly dec with assessment of mid ribs with in/exhalation  -       Cervical/Thoracic Special Tests    Cervical/Thoracic Special Tests Comments  all CS and thoracic special tests are negative.  No neural tension or foraminal closings elicited inc pain or complaints.    -       Lumbosacral Accessory Motions    Lumbosacral Accessory Motions Tested?  Yes  -    PA glide- Sacral base  -- aligned  -SW    Innominate rotation  -- aligned  -SW       Lumbosacral Palpation    Lumbosacral Palpation?  -- no palpational tenderness noted  -       MMT (Manual Muscle Testing)    General MMT Comments  middle trap  4/5 with pain R side.  -      User Key  (r) = Recorded By, (t) = Taken By, (c) = Cosigned By    Initials Name Provider Type    Vee Fernandez, PT DPT Physical Therapist                     PT Assessment/Plan     Row Name 09/08/20 1400          PT Assessment    Functional Limitations  Limitation in home management;Limitations in community activities;Performance in leisure activities;Performance in self-care ADL  -     Impairments  Impaired postural alignment;Impaired muscle length;Pain;Poor body mechanics;Posture;Joint mobility  -     Assessment Comments  Patient is a 21 yo female presenting at 20 weeks gestation.  Her complaints present musculoskeletal in nature along thoracic junction due to postural changes. She is very kyphotic in nature which is becoming more stressed with inc enlargement of bust line. She has dec postural position and poor awareness of neutral spine mechanics. She would benefit from skilled therapy intervention to address and educate on postural positioning, strengthening and stabilization to assist with full term of pregnancy with less pain.   -     Rehab Potential  Good  -     Patient/caregiver participated in establishment of treatment plan and goals  Yes  -SW     Patient would benefit from skilled therapy intervention  Yes  -SW        PT Plan    PT Frequency  1x/week  -     Predicted Duration of Therapy Intervention (Therapy Eval)  10-15 visits  -     PT Plan Comments  postural stretching, neutral spine mechanics, stabilization of core and upper back.  Stretching to assist with elongation of neck and scap stabilizers to improve posture and positioning.   -       User Key  (r) = Recorded By, (t) = Taken By, (c) = Cosigned By    Initials Name Provider Type    Vee Fernandez, PT DPT Physical Therapist            OP Exercises     Row Name 09/08/20 1000             Subjective Comments    Subjective Comments  First term pregnancy now at 20 weeks gestation with  complaints of back pain.  Onset of pain began around 15-16 weeks gestation.  She has tried to modified positions with seated rest but little to no change.  Pain described as achey and sometimes radiates upward the spine vs down the spine.  No numbness and tinglingn associated with complaints.  Never had this prior to pregnancy.  Pain is constant but seems to flux with intensity changing daily and throughout the day.  Worst pain in past 24 hours was 8-9 out of 10.  Best she has felt has been a 5/10.  Stretches have been attempted, but little to no assist.  Openly admits to 2 hits from [employer] to help ease the pain.  Not done daily but when pain intensity increases greatly.  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  BP and lightheadedness experienced with prolonged standing activity.  Most of day is spent sitting for this reason.  Pt admits that she has had higher BP due to anxiety.     -SW         Subjective Pain    Able to rate subjective pain?  yes  -SW      Pre-Treatment Pain Level  7  -SW      Post-Treatment Pain Level  7  -SW         Exercise 1    Exercise Name 1  doorway stretch   -SW      Reps 1  3  -SW      Time 1  30 sec  -SW         Exercise 2    Exercise Name 2  upper trap stretch   -SW      Reps 2  3  -SW      Time 2  30 sec  -SW         Exercise 3    Exercise Name 3  levator stretch   -SW      Reps 3  3  -SW      Time 3  30 sec  -SW         Exercise 4    Exercise Name 4  no money stretch   -SW      Reps 4  10  -SW      Time 4  5 sec  -SW        User Key  (r) = Recorded By, (t) = Taken By, (c) = Cosigned By    Initials Name Provider Type    Vee Fernandez, PT DPT Physical Therapist                      PT OP Goals     Row Name 09/08/20 1400          PT Short Term Goals    STG Date to Achieve  10/08/20  -SW     STG 1  patient to be independent with HEP for stretching and elongation of muscles to improve postural position.   -SW     STG 1 Progress   New  -     STG 2  patient to show neutral spine mechanics with seated posture such that she can assume position without cues during exercise.   -     STG 2 Progress  New  -     STG 3  patient to improve mobility and alignment to thoracic region such that AP glide is normal and without pain.  -     STG 3 Progress  New  -     STG 4  patient to report pain reduction to mild 3/10 or less at least 5-6 days per week with use of positioning and HEP for assist.   -     STG 4 Progress  New  -     STG 5  patient to demonstrate neutral spine mechanics with advanced stabilization for upper trunk control without inc pain greater than 2 points of VAS scale.   -[facility] 5 Progress  New  [facility]        Long Term Goals    LTG Date to Achieve  01/14/21  -     LTG 1  patient to report 70% improved ADL functions with less pain since starting PT intervention.  -     LTG 1 Progress  New  -     LTG 2  patient to recognize and demonstrate neutral control with standing activities such that she is progressed with stability and maintaining good control.   -     LTG 2 Progress  New  -     LTG 3  Patient to be able to continue with work related tasks up til delivery without restrictions due to pain.  -     LTG 3 Progress  New  -     LTG 4  patient to show improved strength to middle trap of 5/5 without pain in assessment.   -     LTG 4 Progress  New  [facility]        Time Calculation    PT Goal Re-Cert Due Date  10/08/20  -       User Key  (r) = Recorded By, (t) = Taken By, (c) = Cosigned By    Initials Name Provider Type    Vee Fernandez, PT DPT Physical Therapist          Therapy Education  Given: HEP  Program: New  How Provided: Verbal, Demonstration, Written  Provided to: Patient  Level of Understanding: Teach back education performed, Verbalized, Demonstrated               Time Calculation:   Start Time: 1022  Stop Time: 1118  Time Calculation (min): 56 min  Therapy Charges for Today     Code Description  Service Date Service Provider Modifiers Qty    89911934282 HC PT EVAL MOD COMPLEXITY 3 9/8/2020 Vee Hagan, PT DPT GP 1    98769098843 HC PT THER PROC EA 15 MIN 9/8/2020 Vee Hagan, PT DPT GP 1                  Vee Hagan, PT DPT  9/8/2020

## 2020-09-15 DIAGNOSIS — Z36.89 ENCOUNTER FOR FETAL ANATOMIC SURVEY: ICD-10-CM

## 2020-09-28 ENCOUNTER — ROUTINE PRENATAL (OUTPATIENT)
Dept: OBSTETRICS AND GYNECOLOGY | Facility: CLINIC | Age: 20
End: 2020-09-28

## 2020-09-28 VITALS — BODY MASS INDEX: 46.01 KG/M2 | DIASTOLIC BLOOD PRESSURE: 68 MMHG | WEIGHT: 235.6 LBS | SYSTOLIC BLOOD PRESSURE: 108 MMHG

## 2020-09-28 DIAGNOSIS — O09.42 HIGH RISK MULTIGRAVIDA IN SECOND TRIMESTER: ICD-10-CM

## 2020-09-28 DIAGNOSIS — O21.9 NAUSEA AND VOMITING DURING PREGNANCY PRIOR TO 22 WEEKS GESTATION: ICD-10-CM

## 2020-09-28 DIAGNOSIS — Z3A.23 23 WEEKS GESTATION OF PREGNANCY: Primary | ICD-10-CM

## 2020-09-28 DIAGNOSIS — O23.592 TRICHOMONAL VAGINITIS DURING PREGNANCY IN SECOND TRIMESTER: ICD-10-CM

## 2020-09-28 DIAGNOSIS — E66.01 MATERNAL MORBID OBESITY IN SECOND TRIMESTER, ANTEPARTUM (HCC): ICD-10-CM

## 2020-09-28 DIAGNOSIS — F12.10 TETRAHYDROCANNABINOL (THC) USE DISORDER, MILD, ABUSE: ICD-10-CM

## 2020-09-28 DIAGNOSIS — A59.01 TRICHOMONAL VAGINITIS DURING PREGNANCY IN SECOND TRIMESTER: ICD-10-CM

## 2020-09-28 DIAGNOSIS — O99.212 MATERNAL MORBID OBESITY IN SECOND TRIMESTER, ANTEPARTUM (HCC): ICD-10-CM

## 2020-09-28 DIAGNOSIS — O36.5910 INTRAUTERINE GROWTH RESTRICTION (IUGR) AFFECTING CARE OF MOTHER, FIRST TRIMESTER, SINGLE OR UNSPECIFIED FETUS: ICD-10-CM

## 2020-09-28 DIAGNOSIS — O99.332 MATERNAL TOBACCO USE IN SECOND TRIMESTER: ICD-10-CM

## 2020-09-28 PROCEDURE — 99213 OFFICE O/P EST LOW 20 MIN: CPT | Performed by: OBSTETRICS & GYNECOLOGY

## 2020-09-28 RX ORDER — ONDANSETRON 8 MG/1
8 TABLET, ORALLY DISINTEGRATING ORAL EVERY 8 HOURS PRN
Qty: 30 TABLET | Refills: 1 | Status: SHIPPED | OUTPATIENT
Start: 2020-09-28 | End: 2020-11-09

## 2020-09-28 NOTE — PROGRESS NOTES
CC: Prenatal visit    Ruth Mancini is a 20 y.o.  at 23w0d.  Doing well.  Denies contractions, LOF, or VB.  Reports good FM.    /68   Wt 107 kg (235 lb 9.6 oz)   LMP 2020 (Approximate)   BMI 46.01 kg/m²   SVE: Not done  Fundal Height (cm): 21 cm  Fetal Heart Rate: 159     Problems (from 20 to present)     Problem Noted Resolved    Trichomonal vaginitis during pregnancy in second trimester 2020 by Misti Mondragon, JOSTIN No    High risk multigravida in second trimester 2020 by Misti Mondragon APRN No    Maternal morbid obesity in second trimester, antepartum (CMS/HCC) 2020 by Misti Mondragon, JOSTIN No    Nausea and vomiting during pregnancy prior to 22 weeks gestation 2020 by Misti Mondragon, JOSTIN No    Tetrahydrocannabinol (THC) use disorder, mild, abuse 2020 by Misti Mondragon, JOSTIN No    Maternal tobacco use in second trimester 2020 by Misti Mondragon, JOSTIN No          A/P: Ruth Mancini is a 20 y.o.  at 23w0d.  - RTC in 4 weeks  - Reviewed COVID-19 visitation policy  - Reviewed COVID-19 precautions     Diagnosis Plan   1. 23 weeks gestation of pregnancy     2. Trichomonal vaginitis during pregnancy in second trimester     3. Maternal morbid obesity in second trimester, antepartum (CMS/HCC)     4. Nausea and vomiting during pregnancy prior to 22 weeks gestation     5. High risk multigravida in second trimester     6. Tetrahydrocannabinol (THC) use disorder, mild, abuse     7. Maternal tobacco use in second trimester     8. Intrauterine growth restriction (IUGR) affecting care of mother, first trimester, single or unspecified fetus   patient is very early.  Dr. Lombardi's interpretation of ultrasound reviewed with family recommends follow-up study in 4 weeks.  The patient denies any history of fever during the pregnancy.    The patient's mother says that her pregnancy  with the patient was characterized by slow growth until the last couple of months when there was exceptionally fast growth and the patient went up weighing nine pounds at birth.  There is no information on the father of the baby and apparently no ongoing relationship     Taz Francois MD  9/28/2020  16:49 CDT

## 2020-10-11 DIAGNOSIS — Z36.2 ENCOUNTER FOR OTHER ANTENATAL SCREENING FOLLOW-UP: ICD-10-CM

## 2020-10-12 RX ORDER — PRENATAL VIT NO.126/IRON/FOLIC 28MG-0.8MG
1 TABLET ORAL DAILY
Qty: 30 TABLET | Refills: 12 | Status: SHIPPED | OUTPATIENT
Start: 2020-10-12 | End: 2020-11-11

## 2020-10-13 ENCOUNTER — TELEPHONE (OUTPATIENT)
Dept: OBSTETRICS AND GYNECOLOGY | Facility: CLINIC | Age: 20
End: 2020-10-13

## 2020-10-13 NOTE — TELEPHONE ENCOUNTER
Ob/refills prenatal..called into [facility].the patient no [id number]..

## 2020-10-26 ENCOUNTER — ROUTINE PRENATAL (OUTPATIENT)
Dept: OBSTETRICS AND GYNECOLOGY | Facility: CLINIC | Age: 20
End: 2020-10-26

## 2020-10-26 VITALS — SYSTOLIC BLOOD PRESSURE: 118 MMHG | WEIGHT: 250.2 LBS | BODY MASS INDEX: 48.86 KG/M2 | DIASTOLIC BLOOD PRESSURE: 72 MMHG

## 2020-10-26 DIAGNOSIS — O09.42 HIGH RISK MULTIGRAVIDA IN SECOND TRIMESTER: ICD-10-CM

## 2020-10-26 DIAGNOSIS — A59.01 TRICHOMONAL VAGINITIS DURING PREGNANCY IN SECOND TRIMESTER: ICD-10-CM

## 2020-10-26 DIAGNOSIS — Z3A.27 27 WEEKS GESTATION OF PREGNANCY: ICD-10-CM

## 2020-10-26 DIAGNOSIS — O99.332 MATERNAL TOBACCO USE IN SECOND TRIMESTER: ICD-10-CM

## 2020-10-26 DIAGNOSIS — O21.9 NAUSEA AND VOMITING DURING PREGNANCY PRIOR TO 22 WEEKS GESTATION: ICD-10-CM

## 2020-10-26 DIAGNOSIS — O23.592 TRICHOMONAL VAGINITIS DURING PREGNANCY IN SECOND TRIMESTER: ICD-10-CM

## 2020-10-26 DIAGNOSIS — O26.849 FETAL SIZE INCONSISTENT WITH DATES: Primary | ICD-10-CM

## 2020-10-26 DIAGNOSIS — F12.10 TETRAHYDROCANNABINOL (THC) USE DISORDER, MILD, ABUSE: ICD-10-CM

## 2020-10-26 DIAGNOSIS — E66.01 MATERNAL MORBID OBESITY IN SECOND TRIMESTER, ANTEPARTUM (HCC): ICD-10-CM

## 2020-10-26 DIAGNOSIS — O99.212 MATERNAL MORBID OBESITY IN SECOND TRIMESTER, ANTEPARTUM (HCC): ICD-10-CM

## 2020-10-26 PROCEDURE — 99213 OFFICE O/P EST LOW 20 MIN: CPT | Performed by: OBSTETRICS & GYNECOLOGY

## 2020-10-26 NOTE — PROGRESS NOTES
CC: Prenatal visit    Ruth Mancini is a 20 y.o.  at 27w0d.  Doing well.  Denies contractions, LOF, or VB.  Reports good FM.    /72   Wt 113 kg (250 lb 3.2 oz)   LMP 2020 (Approximate)   BMI 48.86 kg/m²   SVE: Not done  Fundal Height (cm): 24 cm  Fetal Heart Rate: 155     Problems (from 20 to present)     Problem Noted Resolved    Trichomonal vaginitis during pregnancy in second trimester 2020 by Misti Mondragon, JOSTIN No    High risk multigravida in second trimester 2020 by Misti Mondragon APRN No    Maternal morbid obesity in second trimester, antepartum (CMS/HCC) 2020 by Misti Mondragon, JOSTIN No    Nausea and vomiting during pregnancy prior to 22 weeks gestation 2020 by Misti Mondragon, JOSTIN No    Tetrahydrocannabinol (THC) use disorder, mild, abuse 2020 by Misti Mondragon, JOSTIN No    Maternal tobacco use in second trimester 2020 by Misti Mondragon, JOSTIN No          A/P: Ruth Mancini is a 20 y.o.  at 27w0d.  - RTC in 3 weeks  - Reviewed COVID-19 visitation policy  - Reviewed COVID-19 precautions     Diagnosis Plan   1. Fetal size inconsistent with dates  US Ob Follow Up Transabdominal Approach   2. Trichomonal vaginitis during pregnancy in second trimester     3. Maternal morbid obesity in second trimester, antepartum (CMS/HCC)     4. Nausea and vomiting during pregnancy prior to 22 weeks gestation     5. High risk multigravida in second trimester  Glucose, Post 50 Gm Glucola    CBC (No Diff)   6. Tetrahydrocannabinol (THC) use disorder, mild, abuse     7. Maternal tobacco use in second trimester     8. 27 weeks gestation of pregnancy       Taz Francois MD  10/26/2020  16:50 CDT

## 2020-11-02 DIAGNOSIS — O36.5930 IUGR (INTRAUTERINE GROWTH RETARDATION) AFFECTING MOTHER, THIRD TRIMESTER, NOT APPLICABLE OR UNSPECIFIED FETUS: Primary | ICD-10-CM

## 2020-11-06 DIAGNOSIS — O36.5930 IUGR (INTRAUTERINE GROWTH RETARDATION) AFFECTING MOTHER, THIRD TRIMESTER, NOT APPLICABLE OR UNSPECIFIED FETUS: ICD-10-CM

## 2020-11-09 ENCOUNTER — LAB (OUTPATIENT)
Dept: LAB | Facility: HOSPITAL | Age: 20
End: 2020-11-09

## 2020-11-09 ENCOUNTER — ROUTINE PRENATAL (OUTPATIENT)
Dept: OBSTETRICS AND GYNECOLOGY | Facility: CLINIC | Age: 20
End: 2020-11-09

## 2020-11-09 VITALS — BODY MASS INDEX: 48.63 KG/M2 | WEIGHT: 249 LBS | DIASTOLIC BLOOD PRESSURE: 64 MMHG | SYSTOLIC BLOOD PRESSURE: 102 MMHG

## 2020-11-09 DIAGNOSIS — O21.9 NAUSEA AND VOMITING DURING PREGNANCY PRIOR TO 22 WEEKS GESTATION: ICD-10-CM

## 2020-11-09 DIAGNOSIS — E66.01 MATERNAL MORBID OBESITY IN THIRD TRIMESTER, ANTEPARTUM (HCC): ICD-10-CM

## 2020-11-09 DIAGNOSIS — O09.42 HIGH RISK MULTIGRAVIDA IN SECOND TRIMESTER: ICD-10-CM

## 2020-11-09 DIAGNOSIS — O35.9XX0 SUSPECTED FETAL ANOMALY, ANTEPARTUM, SINGLE OR UNSPECIFIED FETUS: ICD-10-CM

## 2020-11-09 DIAGNOSIS — F12.10 TETRAHYDROCANNABINOL (THC) USE DISORDER, MILD, ABUSE: ICD-10-CM

## 2020-11-09 DIAGNOSIS — O99.333 MATERNAL TOBACCO USE IN THIRD TRIMESTER: ICD-10-CM

## 2020-11-09 DIAGNOSIS — O99.213 MATERNAL MORBID OBESITY IN THIRD TRIMESTER, ANTEPARTUM (HCC): ICD-10-CM

## 2020-11-09 DIAGNOSIS — Z3A.29 29 WEEKS GESTATION OF PREGNANCY: Primary | ICD-10-CM

## 2020-11-09 DIAGNOSIS — O35.EXX0 FETAL HYDRONEPHROSIS DURING PREGNANCY, ANTEPARTUM, SINGLE OR UNSPECIFIED FETUS: ICD-10-CM

## 2020-11-09 DIAGNOSIS — O09.43 HIGH RISK MULTIGRAVIDA IN THIRD TRIMESTER: ICD-10-CM

## 2020-11-09 PROBLEM — A59.01 TRICHOMONAL VAGINITIS DURING PREGNANCY IN SECOND TRIMESTER: Status: RESOLVED | Noted: 2020-06-29 | Resolved: 2020-11-09

## 2020-11-09 PROBLEM — Z3A.23 23 WEEKS GESTATION OF PREGNANCY: Status: RESOLVED | Noted: 2020-09-28 | Resolved: 2020-11-09

## 2020-11-09 PROBLEM — Z3A.27 27 WEEKS GESTATION OF PREGNANCY: Status: RESOLVED | Noted: 2020-10-26 | Resolved: 2020-11-09

## 2020-11-09 PROBLEM — O23.592 TRICHOMONAL VAGINITIS DURING PREGNANCY IN SECOND TRIMESTER: Status: RESOLVED | Noted: 2020-06-29 | Resolved: 2020-11-09

## 2020-11-09 LAB
DEPRECATED RDW RBC AUTO: 39.7 FL (ref 37–54)
ERYTHROCYTE [DISTWIDTH] IN BLOOD BY AUTOMATED COUNT: 12.7 % (ref 12.3–15.4)
GLUCOSE 1H P 100 G GLC PO SERPL-MCNC: 90 MG/DL (ref 60–140)
HCT VFR BLD AUTO: 36.3 % (ref 34–46.6)
HGB BLD-MCNC: 12.6 G/DL (ref 12–15.9)
MCH RBC QN AUTO: 30.4 PG (ref 26.6–33)
MCHC RBC AUTO-ENTMCNC: 34.7 G/DL (ref 31.5–35.7)
MCV RBC AUTO: 87.7 FL (ref 79–97)
PLATELET # BLD AUTO: 299 10*3/MM3 (ref 140–450)
PMV BLD AUTO: 9.8 FL (ref 6–12)
RBC # BLD AUTO: 4.14 10*6/MM3 (ref 3.77–5.28)
WBC # BLD AUTO: 13.4 10*3/MM3 (ref 3.4–10.8)

## 2020-11-09 PROCEDURE — 82950 GLUCOSE TEST: CPT

## 2020-11-09 PROCEDURE — 85027 COMPLETE CBC AUTOMATED: CPT

## 2020-11-09 PROCEDURE — 36415 COLL VENOUS BLD VENIPUNCTURE: CPT

## 2020-11-09 PROCEDURE — 90715 TDAP VACCINE 7 YRS/> IM: CPT | Performed by: FAMILY MEDICINE

## 2020-11-09 PROCEDURE — 90471 IMMUNIZATION ADMIN: CPT | Performed by: FAMILY MEDICINE

## 2020-11-09 PROCEDURE — 99213 OFFICE O/P EST LOW 20 MIN: CPT | Performed by: FAMILY MEDICINE

## 2020-11-09 RX ORDER — AMOXICILLIN 250 MG
1 CAPSULE ORAL DAILY PRN
Qty: 30 TABLET | Refills: 1 | Status: SHIPPED | OUTPATIENT
Start: 2020-11-09 | End: 2021-03-10

## 2020-11-09 NOTE — PROGRESS NOTES
CC: Prenatal visit    Ruth Mancini is a 20 y.o.  at 29w0d.  Doing well.  Denies contractions, LOF, or VB.  Reports good FM.  Complains of lower back pain. Completing glucola testing today. Had growth scan for concern about IUGR.    Growth: cephalic, posterior placenta, FHR 155bpm; EFW 1116g (2lb7oz) 31%tile; AC 21%tile; Rt kidney 5.9mm. Long bones 1-2%tile    /64   Wt 113 kg (249 lb)   LMP 2020 (Approximate)   BMI 48.63 kg/m²   SVE: deferred  Fundal Height (cm): 29 cm  Fetal Heart Rate: 155US   EPDS: 10 - pt reports this is mostly related to her job & she is managing OK.     Problems (from 20 to present)     Problem Noted Resolved    Trichomonal vaginitis during pregnancy in second trimester 2020 by Misti Mondragon, JOSTIN No    High risk multigravida in second trimester 2020 by Misti Mondragon APRN No    Maternal morbid obesity in third trimester, antepartum (CMS/HCC) 2020 by Misti Mondragon APRN No    Nausea and vomiting during pregnancy prior to 22 weeks gestation 2020 by Misti Mondragon, JOSTIN No    Tetrahydrocannabinol (THC) use disorder, mild, abuse 2020 by Misti Mondragon, JOSTIN No    Maternal tobacco use in third trimester 2020 by Misti Mondragon APRN No          A/P: Ruth Mancini is a 20 y.o.  at 29w0d.  - RTC in 2 weeks with Dr. Francois  - [facility] reviewed.  PTL precautions given.  Encouraged to drink 3-4 glasses of ice water if she experiences contractions.  If contractions occur regularly (every 5-10 minutes or less) for 1 hour and do not resolve with drinking fluids and/or taking a warm bath, patient advised to come to L&D for evaluation.  - TDAP given  - Offered pregnancy support belt - none currently in stock. Will call pt when one is available.    MFM RECS: RPT Growth in 4wks; consideration for NIPS due to shortened long bones    - Pt has had chromosomal  analysis (low risk); CARRIER SCREEN ordered today.     Diagnosis Plan   1. 29 weeks gestation of pregnancy     2. Maternal morbid obesity in third trimester, antepartum (CMS/HCC)     3. Nausea and vomiting during pregnancy prior to 22 weeks gestation     4. High risk multigravida in third trimester     5. Tetrahydrocannabinol (THC) use disorder, mild, abuse     6. Maternal tobacco use in third trimester  US Ob Follow Up Transabdominal Approach   7. Fetal hydronephrosis during pregnancy, antepartum, single or unspecified fetus  US Ob Follow Up Transabdominal Approach   8. Suspected fetal anomaly, antepartum, single or unspecified fetus  US Ob Follow Up Transabdominal Approach    INVITAE CARRIER SCREENING       Signature  Sheela Fitzpatrick MD  [facility]'s [address], [place] [address]  Office: [phone]      This document has been electronically signed by Sheela Fitzpatrick MD on November 9, 2020 11:29 CST

## 2020-11-10 DIAGNOSIS — Z13.79 GENETIC SCREENING: ICD-10-CM

## 2020-11-23 ENCOUNTER — ROUTINE PRENATAL (OUTPATIENT)
Dept: OBSTETRICS AND GYNECOLOGY | Facility: CLINIC | Age: 20
End: 2020-11-23

## 2020-11-23 DIAGNOSIS — O21.9 NAUSEA AND VOMITING DURING PREGNANCY PRIOR TO 22 WEEKS GESTATION: ICD-10-CM

## 2020-11-23 DIAGNOSIS — E66.01 MATERNAL MORBID OBESITY IN THIRD TRIMESTER, ANTEPARTUM (HCC): ICD-10-CM

## 2020-11-23 DIAGNOSIS — O09.43 HIGH RISK MULTIGRAVIDA IN THIRD TRIMESTER: ICD-10-CM

## 2020-11-23 DIAGNOSIS — O36.5931 IUGR (INTRAUTERINE GROWTH RESTRICTION) AFFECTING CARE OF MOTHER, THIRD TRIMESTER, FETUS 1: Primary | ICD-10-CM

## 2020-11-23 DIAGNOSIS — O99.213 MATERNAL MORBID OBESITY IN THIRD TRIMESTER, ANTEPARTUM (HCC): ICD-10-CM

## 2020-11-23 DIAGNOSIS — F12.10 TETRAHYDROCANNABINOL (THC) USE DISORDER, MILD, ABUSE: ICD-10-CM

## 2020-11-23 DIAGNOSIS — O99.333 MATERNAL TOBACCO USE IN THIRD TRIMESTER: ICD-10-CM

## 2020-11-23 DIAGNOSIS — O35.EXX0 FETAL HYDRONEPHROSIS DURING PREGNANCY, ANTEPARTUM, SINGLE OR UNSPECIFIED FETUS: ICD-10-CM

## 2020-11-23 PROCEDURE — 99213 OFFICE O/P EST LOW 20 MIN: CPT | Performed by: OBSTETRICS & GYNECOLOGY

## 2020-11-24 VITALS — WEIGHT: 254 LBS | DIASTOLIC BLOOD PRESSURE: 72 MMHG | SYSTOLIC BLOOD PRESSURE: 110 MMHG | BODY MASS INDEX: 49.61 KG/M2

## 2020-11-24 NOTE — PROGRESS NOTES
CC: Prenatal visit    Ruth Mancini is a 20 y.o.  at 31w1d.  Doing well.  Denies contractions, LOF, or VB.  Reports good FM.    /72   Wt 115 kg (254 lb)   LMP 2020 (Approximate)   BMI 49.61 kg/m²   SVE: Not done  Fundal Height (cm): 30 cm  Fetal Heart Rate: 166     Problems (from 20 to present)     Problem Noted Resolved    Fetal hydronephrosis in pregnancy, antepartum condition 2020 by Sheela Fitzpatrick MD No    Overview Signed 2020 12:16 PM by Sheela Fitzpatrick MD     RT 5.9mm         High risk multigravida in third trimester 2020 by Misti Mondraogn APRN No    Maternal morbid obesity in third trimester, antepartum (CMS/HCC) 2020 by Misti Mondragon APRN No    Nausea and vomiting during pregnancy prior to 22 weeks gestation 2020 by Misti Mondragon APRN No    Tetrahydrocannabinol (THC) use disorder, mild, abuse 2020 by Misti Mondragon APRN No    Maternal tobacco use in third trimester 2020 by Misti Mondragon APRN No    Trichomonal vaginitis during pregnancy in second trimester 2020 by Misti Mondragon APRN 2020 by Sheela Fitzpatrick MD          A/P: Ruth Mancini is a 20 y.o.  at 31w1d.  - RTC in 1 weeks  - Reviewed COVID-19 visitation policy  - Reviewed COVID-19 precautions     Diagnosis Plan   1. IUGR (intrauterine growth restriction) affecting care of mother, third trimester, fetus 1  Ambulatory MFM Referral to PDC follow-up ultrasound in about 1 week fetal maternal consultation   2. Fetal hydronephrosis during pregnancy, antepartum, single or unspecified fetus     3. Maternal morbid obesity in third trimester, antepartum (CMS/HCC)     4. Nausea and vomiting during pregnancy prior to 22 weeks gestation     5. High risk multigravida in third trimester     6. Tetrahydrocannabinol (THC) use disorder, mild, abuse     7. Maternal tobacco  use in third trimester       Taz Francois MD  11/24/2020  14:35 CST

## 2020-11-30 PROBLEM — Z14.8 CARRIER OF GENETIC DISORDER: Status: ACTIVE | Noted: 2020-11-30

## 2020-12-01 DIAGNOSIS — O35.9XX0 SUSPECTED FETAL ANOMALY, ANTEPARTUM, SINGLE OR UNSPECIFIED FETUS: ICD-10-CM

## 2020-12-07 ENCOUNTER — ROUTINE PRENATAL (OUTPATIENT)
Dept: OBSTETRICS AND GYNECOLOGY | Facility: CLINIC | Age: 20
End: 2020-12-07

## 2020-12-07 ENCOUNTER — CONSULT (OUTPATIENT)
Dept: OBSTETRICS AND GYNECOLOGY | Facility: CLINIC | Age: 20
End: 2020-12-07

## 2020-12-07 DIAGNOSIS — O99.213 MATERNAL MORBID OBESITY IN THIRD TRIMESTER, ANTEPARTUM (HCC): ICD-10-CM

## 2020-12-07 DIAGNOSIS — O09.43 HIGH RISK MULTIGRAVIDA IN THIRD TRIMESTER: ICD-10-CM

## 2020-12-07 DIAGNOSIS — Z14.8 CARRIER OF GENETIC DISORDER: ICD-10-CM

## 2020-12-07 DIAGNOSIS — E66.01 MATERNAL MORBID OBESITY IN THIRD TRIMESTER, ANTEPARTUM (HCC): ICD-10-CM

## 2020-12-07 DIAGNOSIS — Z3A.33 33 WEEKS GESTATION OF PREGNANCY: Primary | ICD-10-CM

## 2020-12-07 DIAGNOSIS — O35.EXX0 FETAL HYDRONEPHROSIS DURING PREGNANCY, ANTEPARTUM, SINGLE OR UNSPECIFIED FETUS: ICD-10-CM

## 2020-12-07 DIAGNOSIS — O36.5930 MATERNAL CARE FOR POOR FETAL GROWTH IN THIRD TRIMESTER, SINGLE OR UNSPECIFIED FETUS: Primary | ICD-10-CM

## 2020-12-07 DIAGNOSIS — O99.333 MATERNAL TOBACCO USE IN THIRD TRIMESTER: ICD-10-CM

## 2020-12-07 DIAGNOSIS — O21.9 NAUSEA AND VOMITING DURING PREGNANCY PRIOR TO 22 WEEKS GESTATION: ICD-10-CM

## 2020-12-07 DIAGNOSIS — F12.10 TETRAHYDROCANNABINOL (THC) USE DISORDER, MILD, ABUSE: ICD-10-CM

## 2020-12-07 PROCEDURE — 99213 OFFICE O/P EST LOW 20 MIN: CPT | Performed by: OBSTETRICS & GYNECOLOGY

## 2020-12-07 PROCEDURE — 99241 PR OFFICE CONSULTATION NEW/ESTAB PATIENT 15 MIN: CPT | Performed by: OBSTETRICS & GYNECOLOGY

## 2020-12-07 RX ORDER — PNV NO.95/FERROUS FUM/FOLIC AC 28MG-0.8MG
1 TABLET ORAL DAILY
COMMUNITY
Start: 2020-12-01 | End: 2022-01-13

## 2020-12-08 VITALS — WEIGHT: 259 LBS | SYSTOLIC BLOOD PRESSURE: 128 MMHG | DIASTOLIC BLOOD PRESSURE: 80 MMHG | BODY MASS INDEX: 50.58 KG/M2

## 2020-12-08 PROBLEM — Z3A.33 33 WEEKS GESTATION OF PREGNANCY: Status: ACTIVE | Noted: 2020-12-08

## 2020-12-08 NOTE — PROGRESS NOTES
Patient seen in Maternal Fetal Medicine clinic today. Please see full note in under imaging tab of patient chart in Epic (Viewpoint report).    Minerva Sibley MD

## 2020-12-08 NOTE — PROGRESS NOTES
CC: Prenatal visit    Ruth Mancini is a 20 y.o.  at 33w1d.  Doing well.  Denies contractions, LOF, or VB.  Reports good FM.    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 Partner scn recommended         Fetal hydronephrosis in pregnancy, antepartum condition 2020 by Sheela Fitzpatrick MD No    Overview Signed 2020 12:16 PM by Sheela Fitzpatrick MD     RT 5.9mm         High risk multigravida in third trimester 2020 by Misti Mondragon APRN No    Maternal morbid obesity in third trimester, antepartum (CMS/HCC) 2020 by Misti Mondragon APRN No    Nausea and vomiting during pregnancy prior to 22 weeks gestation 2020 by Misti Mondragon APRN No    Tetrahydrocannabinol (THC) use disorder, mild, abuse 2020 by Misti Mondragon, JOSTIN No    Maternal tobacco use in third trimester 2020 by Misti Mondragon APRN No    Trichomonal vaginitis during pregnancy in second trimester 2020 by Misti Mondragon APRN 2020 by Sheela Fitzpatrick MD          A/P: Ruth Mancini is a 20 y.o.  at 33w1d.  - RTC in 1 weeks  - Reviewed COVID-19 visitation policy  - Reviewed COVID-19 precautions     Diagnosis Plan   1. 33 weeks gestation of pregnancy     2. 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 Fetal hydronephrosis during pregnancy, antepartum, single or unspecified fetus  US Fetal Biophysical Profile;Without Non-Stress Testing   4. Maternal morbid obesity in third trimester, antepartum (CMS/HCC)     5. Nausea and vomiting during pregnancy prior to 22 weeks gestation     6. High risk multigravida in  third trimester   patient saw  12/7/2020 her input is most appreciated   7. Tetrahydrocannabinol (THC) use disorder, mild, abuse     8. Maternal tobacco use in third trimester       Taz Francois MD  12/8/2020  09:07 CST

## 2020-12-14 ENCOUNTER — ROUTINE PRENATAL (OUTPATIENT)
Dept: OBSTETRICS AND GYNECOLOGY | Facility: CLINIC | Age: 20
End: 2020-12-14

## 2020-12-14 VITALS — WEIGHT: 264 LBS | SYSTOLIC BLOOD PRESSURE: 122 MMHG | DIASTOLIC BLOOD PRESSURE: 74 MMHG | BODY MASS INDEX: 51.56 KG/M2

## 2020-12-14 DIAGNOSIS — Z14.8 CARRIER OF GENETIC DISORDER: ICD-10-CM

## 2020-12-14 DIAGNOSIS — F12.10 TETRAHYDROCANNABINOL (THC) USE DISORDER, MILD, ABUSE: ICD-10-CM

## 2020-12-14 DIAGNOSIS — O99.333 MATERNAL TOBACCO USE IN THIRD TRIMESTER: ICD-10-CM

## 2020-12-14 DIAGNOSIS — O21.9 NAUSEA AND VOMITING DURING PREGNANCY PRIOR TO 22 WEEKS GESTATION: ICD-10-CM

## 2020-12-14 DIAGNOSIS — O09.43 HIGH RISK MULTIGRAVIDA IN THIRD TRIMESTER: ICD-10-CM

## 2020-12-14 DIAGNOSIS — O99.213 MATERNAL MORBID OBESITY IN THIRD TRIMESTER, ANTEPARTUM (HCC): ICD-10-CM

## 2020-12-14 DIAGNOSIS — E66.01 MATERNAL MORBID OBESITY IN THIRD TRIMESTER, ANTEPARTUM (HCC): ICD-10-CM

## 2020-12-14 DIAGNOSIS — Z3A.34 34 WEEKS GESTATION OF PREGNANCY: Primary | ICD-10-CM

## 2020-12-14 PROCEDURE — 99213 OFFICE O/P EST LOW 20 MIN: CPT | Performed by: OBSTETRICS & GYNECOLOGY

## 2020-12-15 NOTE — PROGRESS NOTES
CC: Prenatal visit    Ruth Mancini is a 20 y.o.  at 34w1d.  Doing well.  Denies contractions, LOF, or VB.  Reports good FM.    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 Partner scn recommended         Fetal hydronephrosis in pregnancy, antepartum condition 2020 by Sheela Fitzpatrick MD No    Overview Signed 2020 12:16 PM by Sheela Fitzpatrick MD     RT 5.9mm         High risk multigravida in third trimester 2020 by Misti Mondragon APRN No    Maternal morbid obesity in third trimester, antepartum (CMS/HCC) 2020 by Misti Mondragon APRN No    Nausea and vomiting during pregnancy prior to 22 weeks gestation 2020 by Misti Mondragon APRN No    Tetrahydrocannabinol (THC) use disorder, mild, abuse 2020 by Misti Mondragon APRN No    Maternal tobacco use in third trimester 2020 by Misti Mondragon APRN No    Trichomonal vaginitis during pregnancy in second trimester 2020 by Misti Mondragon APRN 2020 by Sheela Fitzpatrick MD          A/P: Ruth Mancini is a 20 y.o.  at 34w1d.  - RTC in 1 weeks  - Reviewed COVID-19 visitation policy  - Reviewed COVID-19 precautions     Diagnosis Plan   1. 34 weeks gestation of pregnancy     2. 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 Maternal morbid obesity in third trimester, antepartum (CMS/HCC)     4. Nausea and vomiting during pregnancy prior to 22 weeks gestation     5. High risk multigravida in third trimester     6. Tetrahydrocannabinol (THC) use disorder, mild, abuse    strongly encouraged to discontinue use   7. Maternal tobacco use in third trimester   strongly encouraged to discontinue use     Taz Francois MD  12/15/2020  15:44 CST

## 2021-01-06 ENCOUNTER — ROUTINE PRENATAL (OUTPATIENT)
Dept: OBSTETRICS AND GYNECOLOGY | Facility: CLINIC | Age: 21
End: 2021-01-06

## 2021-01-06 VITALS — BODY MASS INDEX: 51.91 KG/M2 | DIASTOLIC BLOOD PRESSURE: 76 MMHG | WEIGHT: 265.8 LBS | SYSTOLIC BLOOD PRESSURE: 110 MMHG

## 2021-01-06 DIAGNOSIS — Z14.8 CARRIER OF GENETIC DISORDER: ICD-10-CM

## 2021-01-06 DIAGNOSIS — O99.213 MATERNAL MORBID OBESITY IN THIRD TRIMESTER, ANTEPARTUM (HCC): ICD-10-CM

## 2021-01-06 DIAGNOSIS — O21.9 NAUSEA AND VOMITING DURING PREGNANCY PRIOR TO 22 WEEKS GESTATION: ICD-10-CM

## 2021-01-06 DIAGNOSIS — O26.849 FETAL SIZE INCONSISTENT WITH DATES: ICD-10-CM

## 2021-01-06 DIAGNOSIS — O09.43 HIGH RISK MULTIGRAVIDA IN THIRD TRIMESTER: ICD-10-CM

## 2021-01-06 DIAGNOSIS — O35.EXX0 FETAL HYDRONEPHROSIS DURING PREGNANCY, ANTEPARTUM, SINGLE OR UNSPECIFIED FETUS: Primary | ICD-10-CM

## 2021-01-06 DIAGNOSIS — O99.333 MATERNAL TOBACCO USE IN THIRD TRIMESTER: ICD-10-CM

## 2021-01-06 DIAGNOSIS — F12.10 TETRAHYDROCANNABINOL (THC) USE DISORDER, MILD, ABUSE: ICD-10-CM

## 2021-01-06 DIAGNOSIS — O35.EXX0 FETAL HYDRONEPHROSIS DURING PREGNANCY, ANTEPARTUM, SINGLE OR UNSPECIFIED FETUS: ICD-10-CM

## 2021-01-06 DIAGNOSIS — Z36.85 ANTENATAL SCREENING FOR STREPTOCOCCUS B: Primary | ICD-10-CM

## 2021-01-06 DIAGNOSIS — E66.01 MATERNAL MORBID OBESITY IN THIRD TRIMESTER, ANTEPARTUM (HCC): ICD-10-CM

## 2021-01-06 PROCEDURE — 87653 STREP B DNA AMP PROBE: CPT | Performed by: OBSTETRICS & GYNECOLOGY

## 2021-01-06 PROCEDURE — 99213 OFFICE O/P EST LOW 20 MIN: CPT | Performed by: OBSTETRICS & GYNECOLOGY

## 2021-01-07 LAB — GROUP B STREP, DNA: NEGATIVE

## 2021-01-09 NOTE — PROGRESS NOTES
CC: Prenatal visit    Ruth Mancini is a 20 y.o.  at 37w5d.  Doing well.  Denies contractions, LOF, or VB.  Reports good FM.    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 Partner scn recommended         Fetal hydronephrosis in pregnancy, antepartum condition 2020 by Sheela Fitzpatrick MD No    Overview Signed 2020 12:16 PM by Sheela Fitzpatrick MD     RT 5.9mm         High risk multigravida in third trimester 2020 by Misti Mondragon APRN No    Maternal morbid obesity in third trimester, antepartum (CMS/HCC) 2020 by Misti Mondragon APRN No    Nausea and vomiting during pregnancy prior to 22 weeks gestation 2020 by Misti Mondragon APRN No    Tetrahydrocannabinol (THC) use disorder, mild, abuse 2020 by Misti Mondragon APRN No    Maternal tobacco use in third trimester 2020 by Misti Mondragon APRN No    Trichomonal vaginitis during pregnancy in second trimester 2020 by Misti Mondragon APRN 2020 by Sheela Fitzpatrick MD          A/P: Ruth Mancini is a 20 y.o.  at 37w5d.  - RTC in 1 weeks  - Reviewed COVID-19 visitation policy  - Reviewed COVID-19 precautions     Diagnosis Plan   1.  screening for streptococcus B  Group B Strep (Molecular) - Swab, Vaginal/Rectum    Biophysical Profile   2. 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 Fetal hydronephrosis during pregnancy, antepartum, single or unspecified fetus  Biophysical Profile   4. Maternal morbid obesity in third  trimester, antepartum (CMS/HCC)  Biophysical Profile   5. Nausea and vomiting during pregnancy prior to 22 weeks gestation  Biophysical Profile   6. High risk multigravida in third trimester  Biophysical Profile   7. Tetrahydrocannabinol (THC) use disorder, mild, abuse  Biophysical Profile   8. Maternal tobacco use in third trimester  Biophysical Profile   9. Fetal size inconsistent with dates  Biophysical Profile    Needs ultrasound follow-up prior findings of short long bones     Taz Francois MD  1/9/2021  00:56 CST

## 2021-01-11 ENCOUNTER — ROUTINE PRENATAL (OUTPATIENT)
Dept: OBSTETRICS AND GYNECOLOGY | Facility: CLINIC | Age: 21
End: 2021-01-11

## 2021-01-11 VITALS — DIASTOLIC BLOOD PRESSURE: 78 MMHG | WEIGHT: 274.4 LBS | BODY MASS INDEX: 53.59 KG/M2 | SYSTOLIC BLOOD PRESSURE: 108 MMHG

## 2021-01-11 DIAGNOSIS — Z14.8 CARRIER OF GENETIC DISORDER: ICD-10-CM

## 2021-01-11 DIAGNOSIS — O35.EXX0 FETAL HYDRONEPHROSIS DURING PREGNANCY, ANTEPARTUM, SINGLE OR UNSPECIFIED FETUS: ICD-10-CM

## 2021-01-11 DIAGNOSIS — O99.333 MATERNAL TOBACCO USE IN THIRD TRIMESTER: ICD-10-CM

## 2021-01-11 DIAGNOSIS — O21.9 NAUSEA AND VOMITING DURING PREGNANCY PRIOR TO 22 WEEKS GESTATION: ICD-10-CM

## 2021-01-11 DIAGNOSIS — F12.10 TETRAHYDROCANNABINOL (THC) USE DISORDER, MILD, ABUSE: ICD-10-CM

## 2021-01-11 DIAGNOSIS — O99.213 MATERNAL MORBID OBESITY IN THIRD TRIMESTER, ANTEPARTUM (HCC): ICD-10-CM

## 2021-01-11 DIAGNOSIS — O09.43 HIGH RISK MULTIGRAVIDA IN THIRD TRIMESTER: ICD-10-CM

## 2021-01-11 DIAGNOSIS — Z3A.39 39 WEEKS GESTATION OF PREGNANCY: ICD-10-CM

## 2021-01-11 DIAGNOSIS — Z3A.38 38 WEEKS GESTATION OF PREGNANCY: Primary | ICD-10-CM

## 2021-01-11 DIAGNOSIS — E66.01 MATERNAL MORBID OBESITY IN THIRD TRIMESTER, ANTEPARTUM (HCC): ICD-10-CM

## 2021-01-11 PROCEDURE — 99212 OFFICE O/P EST SF 10 MIN: CPT | Performed by: OBSTETRICS & GYNECOLOGY

## 2021-01-11 RX ORDER — PROMETHAZINE HYDROCHLORIDE 25 MG/1
12.5 TABLET ORAL EVERY 6 HOURS PRN
Status: CANCELLED | OUTPATIENT
Start: 2021-01-11

## 2021-01-11 RX ORDER — METHYLERGONOVINE MALEATE 0.2 MG/ML
200 INJECTION INTRAVENOUS ONCE AS NEEDED
Status: CANCELLED | OUTPATIENT
Start: 2021-01-11

## 2021-01-11 RX ORDER — SODIUM CHLORIDE 0.9 % (FLUSH) 0.9 %
3-10 SYRINGE (ML) INJECTION AS NEEDED
Status: CANCELLED | OUTPATIENT
Start: 2021-01-11

## 2021-01-11 RX ORDER — PROMETHAZINE HYDROCHLORIDE 25 MG/1
12.5 SUPPOSITORY RECTAL EVERY 6 HOURS PRN
Status: CANCELLED | OUTPATIENT
Start: 2021-01-11

## 2021-01-11 RX ORDER — SODIUM CHLORIDE 0.9 % (FLUSH) 0.9 %
3 SYRINGE (ML) INJECTION EVERY 12 HOURS SCHEDULED
Status: CANCELLED | OUTPATIENT
Start: 2021-01-11

## 2021-01-11 RX ORDER — MISOPROSTOL 100 UG/1
800 TABLET ORAL AS NEEDED
Status: CANCELLED | OUTPATIENT
Start: 2021-01-11

## 2021-01-11 RX ORDER — MISOPROSTOL 100 UG/1
25 TABLET ORAL ONCE
Status: CANCELLED | OUTPATIENT
Start: 2021-01-21

## 2021-01-11 RX ORDER — LIDOCAINE HYDROCHLORIDE 10 MG/ML
5 INJECTION, SOLUTION EPIDURAL; INFILTRATION; INTRACAUDAL; PERINEURAL AS NEEDED
Status: CANCELLED | OUTPATIENT
Start: 2021-01-11

## 2021-01-11 RX ORDER — CARBOPROST TROMETHAMINE 250 UG/ML
250 INJECTION, SOLUTION INTRAMUSCULAR AS NEEDED
Status: CANCELLED | OUTPATIENT
Start: 2021-01-11

## 2021-01-12 NOTE — H&P
Obstetric History and Physical    Chief complaint: I am sure I want to have my labor induced        Patient is a 20 y.o. female  currently at 38w0d, who presents with request for induction of labor.  Pregnancy had been complicated by early question of intrauterine growth restriction.  This had been with prominent long bone growth restriction.  This seemed to resolve first with resolution of the IUGR and then largely of the growth restriction of the large bones.  As she is now approaching 39 weeks I discussed option of induction versus the advantages of waiting onset of labor.  After considering the risk benefits alternatives arrive study she wants to proceed with induction of labor.  Risk of hypertonic contractions damage mother baby reviewed risk of iatrogenic  section reviewed. Patient requests tubal sterilization by laparoscopy. She understands the short term risk of procedure to include death. She understands the risk of damage to bowel with risk of colostomy. She understands the risk of damage to the bowel not recognized at time of procedure with sepsis and/or need to return to OR. She understands the risk of damage to blood vessels with massive hemorrhage and transfusion and other complications. She understands the need for possible laparotomy for complications if unable to perform laparoscopically. She understands how her medical history and surgical history affects the likelihood of this happening.     She understands the long-term risk of sterilization including but not limited to: failure, intended to be permanent with possible regret, increased risk of menorrhagia and pelvic pain. That if there is a failure it is more likely it could be an ectopic pregnancy..    Her prenatal care is has been through [facility]     Obstetric History   # 1 - Date: 2020, Sex: None, Weight: None, GA: None, Delivery: Spontaneous , Apgar1: None, Apgar5: None, Living: None, Birth  Comments: None    # 2 - Date: None, Sex: None, Weight: None, GA: None, Delivery: None, Apgar1: None, Apgar5: None, Living: None, Birth Comments: None       The following portions of the patients history were reviewed and updated as appropriate: current medications, allergies, past medical history, past surgical history, past family history, past social history and problem list .       Prenatal Information:  Prenatal Results     POC Urine Glucose/Protein     Test Value Reference Range Date Time    Urine Glucose        Urine Protein              Initial Prenatal Labs     Test Value Reference Range Date Time    Hemoglobin 13.6 g/dL 12.0 - 15.9 06/17/20 1416    Hematocrit 40.3 % 34.0 - 46.6 06/17/20 1416    Platelets 299 10*3/mm3 140 - 450 11/09/20 1107      276 10*3/mm3 140 - 450 06/17/20 1416    Rubella IgG Positive   06/17/20 1416    Hepatitis B SAg Non-Reactive  Non-Reactive 06/17/20 1416    Hepatitis C Ab Non-Reactive  Non-Reactive 06/17/20 1416    RPR Non-Reactive  Non-Reactive 06/17/20 1416    ABO A   06/17/20 1416    Rh Positive   06/17/20 1416    Antibody Screen Negative   06/17/20 1416    HIV Non-Reactive  Non-Reactive 06/17/20 1416    Urine Culture >100,000 CFU/mL Mixed Mayela Isolated   06/17/20 1354    Gonorrhea Not Detected  Not Detected 08/31/20 1504      Negative  Negative 06/17/20 1354    Chlamydia Not Detected  Not Detected 08/31/20 1504      Negative  Negative 06/17/20 1354    TSH              2nd and 3rd Trimester     Test Value Reference Range Date Time    Hemoglobin (repeated) 12.6 g/dL 12.0 - 15.9 11/09/20 1107    Hematocrit (repeated) 36.3 % 34.0 - 46.6 11/09/20 1107    GCT 90 mg/dL 60 - 140 11/09/20 1107      87 mg/dL 60 - 140 07/20/20 1153    Antibody Screen (repeated)        GTT Fasting        GTT 1 Hr        GTT 2 Hr        GTT 3 Hr        Group B Strep Negative  Negative 01/06/21 1501          Drug Screening     Test Value Reference Range Date Time    Amphetamine Screen Negative  Negative  06/17/20 1354    Barbiturate Screen Negative  Negative 06/17/20 1354    Benzodiazepine Screen Negative  Negative 06/17/20 1354    Methadone Screen Negative  Negative 06/17/20 1354    Phencyclidine Screen Negative  Negative 06/17/20 1354    Opiates Screen Negative  Negative 06/17/20 1354    THC Screen Positive  Negative 06/17/20 1354    Cocaine Screen Negative  Negative 06/17/20 1354    Propoxyphene Screen Negative  Negative 06/17/20 1354    Buprenorphine Screen Negative  Negative 06/17/20 1354    Methamphetamine Screen Negative  Negative 06/17/20 1354    Oxycodone Screen Negative  Negative 06/17/20 1354    Tricyclic Antidepressants Screen Negative  Negative 06/17/20 1354          Other (Risk screening)     Test Value Reference Range Date Time    Varicella IgG        Parvovirus IgG        CMV IgG        Cystic Fibrosis        Hemoglobin electrophoresis        NIPT        MSAFP-4        AFP (for NTD only)                  External Prenatal Results     Pregnancy Outside Results - Transcribed From Office Records - See Scanned Records For Details     Test Value Date Time    Hgb 12.6 g/dL 11/09/20 1107      13.6 g/dL 06/17/20 1416    Hct 36.3 % 11/09/20 1107      40.3 % 06/17/20 1416    ABO A  06/17/20 1416    Rh Positive  06/17/20 1416    Antibody Screen Negative  06/17/20 1416    Glucose Fasting GTT       Glucose Tolerance Test 1 hour       Glucose Tolerance Test 3 hour       Gonorrhea (discrete) Not Detected  08/31/20 1504      Negative  06/17/20 1354    Chlamydia (discrete) Not Detected  08/31/20 1504      Negative  06/17/20 1354    RPR Non-Reactive  06/17/20 1416    VDRL       Syphilis Antibody       Rubella Positive  06/17/20 1416    HBsAg Non-Reactive  06/17/20 1416    Herpes Simplex Virus PCR       Herpes Simplex VIrus Culture       HIV Non-Reactive  06/17/20 1416    Hep C RNA Quant PCR NONREACTIVE  08/01/19 1529    Hep C Antibody Non-Reactive  06/17/20 1416    AFP       Group B Strep Negative  01/06/21 1501     GBS Susceptibility to Clindamycin       GBS Susceptibility to Erythromycin       Fetal Fibronectin       Genetic Testing, Maternal Blood             Drug Screening     Test Value Date Time    Urine Drug Screen       Amphetamine Screen Negative  20 1354    Barbiturate Screen Negative  20 1354    Benzodiazepine Screen Negative  20 1354    Methadone Screen Negative  20 1354    Phencyclidine Screen Negative  20 1354    Opiates Screen Negative  20 1354    THC Screen Positive  20 1354    Cocaine Screen       Propoxyphene Screen Negative  20 1354    Buprenorphine Screen Negative  20 1354    Methamphetamine Screen       Oxycodone Screen Negative  20 1354    Tricyclic Antidepressants Screen Negative  20 1354                 Past OB History:     OB History    Para Term  AB Living   2 0 0 0 1 0   SAB TAB Ectopic Molar Multiple Live Births   1 0 0 0 0 0      # Outcome Date GA Lbr Brad/2nd Weight Sex Delivery Anes PTL Lv   2 Current            1 SAB 2020     SAB           ALLERGIES:     Allergies   Allergen Reactions   • Naprosyn [Naproxen] Palpitations     twitching        Home Medications:     Prior to Admission medications    Medication Sig Start Date End Date Taking? Authorizing Provider   Prenatal Vit-Fe Fumarate-FA (prenatal vitamin 28-0.8) 28-0.8 MG tablet tablet Take 1 tablet by mouth Daily. 20  Yes Provider, MD Radha   sennosides-docusate (Senokot S) 8.6-50 MG per tablet Take 1 tablet by mouth Daily As Needed for Constipation. 20  Yes Sheela iFtzpatrick MD       Past Medical History: Past Medical History:   Diagnosis Date   • Allergic asthma     IgE mediated   • Allergic conjunctivitis    • Allergic rhinitis    • Allergic rhinitis due to pollen    • Astigmatism    • Chlamydia    • Common cold    • Conjunctivitis    • Cough    • Depression    • Diarrhea    • Disorder of skin    • Dysfunction of eustachian tube    •  Dysmenorrhea    • Exanthematous disorder    • Food poisoning    • Headache    • Herpes    • Hyperglycemia     mom says she is borderline diabetic   • Ingrowing nail    • Insect bite     nonvenomous   • Intrinsic asthma without status asthmaticus    • Irregular periods    • Knee pain     patellofemoral syndrome   • Migraine    • Nausea and vomiting    • Need for immunization against influenza    • Otalgia, right ear    • Pain in right arm     bruising right forearm   • Pain in throat    • Pain in toe    • Pharyngitis    • Rhinorrhea     posterior   • Rib pain     actually intercostal spasm   • Smoker    • Substance abuse (CMS/HCC)    • Tick bite     history of tick in right ear with abrasion of the ear canal   • Trichomonal vaginitis in pregnancy in first trimester 6/29/2020   • Upper respiratory infection    • Wheezing       Past Surgical History Past Surgical History:   Procedure Laterality Date   • NO PAST SURGERIES     • OTHER SURGICAL HISTORY  01/20/2015    avulsion of nail plate      Family History: Family History   Problem Relation Age of Onset   • Ovarian cysts Mother    • Anemia Mother    • Diabetes Mother    • Other Brother         arthrogryposis   • Alzheimer's disease Other    • Asthma Other    • ADD / ADHD Other    • Bipolar disorder Other    • Breast cancer Other    • Depression Other    • Diabetes Other    • Hyperlipidemia Other    • Hypertension Other    • Lung cancer Other    • Mental illness Other    • Ovarian cancer Other    • Rheum arthritis Other    • Stroke Other    • Other Other         toxemia of pregnancy   • Psoriasis Other    • Samayoa-Kendall syndrome Other    • Diabetes Father    • Hypertension Father    • No Known Problems Paternal Grandfather    • Heart disease Paternal Grandmother    • Hypertension Paternal Grandmother    • Hypertension Maternal Grandmother    • Diabetes Maternal Grandmother    • Hyperthyroidism Maternal Grandmother    • Heart attack Maternal Grandmother    •  Samayoa-Kendall syndrome Maternal Grandmother       Social History:  reports that she has been smoking cigarettes. She has been smoking about 0.25 packs per day. She has never used smokeless tobacco.   reports previous alcohol use.   reports current drug use. Drug: Marijuana.        Review of Systems                                                                                                                      Constitutional: Denies night sweats    HENT: No hearing changes, denies ear pain    Eye: No eye pain; no foreign body in eye    Pulmonary: No hemoptysis    Cardiovascular: No claudication    GI: No hematemesis    Musculoskeletal: No arthralgias, no joint swelling    Endocrine: No polydipsia or polyuria    Hematologic: Denies any free bleeding    Psychiatric: Denies any delusions      Objective     Documented Vitals    01/11/21 0830   BP: 108/78   Weight: 124 kg (274 lb 6.4 oz)          OBGyn Exam  Constitutional: Appears to be in no acute distress; Eyes: sclera normal; Endocrine system: thyroid palpate is normal; Pulmonary system: lungs clear; Cardiovascular system: heart regular rate and rhythm; Gastrointestinal system: abdomen soft nontender, active bowel sounds; Urologic system: CVA negative; Psychiatric: appropriate insight; Neurologic: gait within normal limits       Last Labs  Lab Results   Component Value Date    WBC 13.40 (H) 11/09/2020    RBC 4.14 11/09/2020    HGB 12.6 11/09/2020    HCT 36.3 11/09/2020    MCV 87.7 11/09/2020    MCH 30.4 11/09/2020    MCHC 34.7 11/09/2020    RDW 12.7 11/09/2020    RDWSD 39.7 11/09/2020    MPV 9.8 11/09/2020     11/09/2020        Lab Results   Component Value Date    GLUCOSE 83 12/20/2019    BUN 10 12/20/2019    CREATININE 0.60 12/20/2019     12/20/2019    K 3.8 12/20/2019     12/20/2019    CO2 25.0 12/20/2019    CALCIUM 9.4 12/20/2019    PROTEINTOT 6.8 12/20/2019    ALBUMIN 4.00 12/20/2019    ALT 11 12/20/2019    AST 9 12/20/2019    ALKPHOS 48  2019    BILITOT 0.3 2019    EGFRIFNONA 126 2019    GLOB 2.8 2019    AGRATIO 1.4 2019    BCR 16.7 2019    ANIONGAP 10.0 2019       Lab Results   Component Value Date    HCGQUAL Negative 2019         Assessment/Plan:  1. 20 y.o. [id number].  Request elective induction of labor at 39 weeks earlier concerns about inner uterine growth restriction in possible long bone abnormality seem to have resolved.  Cell free DNA had been normal.  After reviewing the risk benefits alternatives induction of labor wishes to proceed.  She understands how habitus puts her at increased risk complications.    Ruth Mancini and I have discussed pain goals for this hospitalization after reviewing her current clinical condition, medical history and prior pain experiences.  The goal is to keep her pain level 3.  To help achieve this, I plan to  multi modal pain management.       This document has been electronically signed by Taz Francois MD on 2021 21:13 CST\    Please note that portions of this note were completed with a voice recognition program.

## 2021-01-12 NOTE — PROGRESS NOTES
CC: Prenatal visit    Ruth Mancini is a 20 y.o.  at 38w0d.  Doing well.  Denies contractions, LOF, or VB.  Reports good FM.    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 Partner scn recommended         Fetal hydronephrosis in pregnancy, antepartum condition 2020 by Sheela Fitzpatrick MD No    Overview Signed 2020 12:16 PM by Sheela Fitzpatrick MD     RT 5.9mm         High risk multigravida in third trimester 2020 by Misti Mondragon APRN No    Maternal morbid obesity in third trimester, antepartum (CMS/HCC) 2020 by Misti Mondragon APRN No    Nausea and vomiting during pregnancy prior to 22 weeks gestation 2020 by Misti Mondragon APRN No    Tetrahydrocannabinol (THC) use disorder, mild, abuse 2020 by Misti Mondragon APRN No    Maternal tobacco use in third trimester 2020 by Misti Mondragon APRN No    Trichomonal vaginitis during pregnancy in second trimester 2020 by Misti Mondragon APRN 2020 by Sheela Fitzpatrick MD          A/P: Ruth Mancini is a 20 y.o.  at 38w0d.  - RTC in 1 weeks  - Reviewed COVID-19 visitation policy  - Reviewed COVID-19 precautions     Diagnosis Plan   1. 38 weeks gestation of pregnancy     2. 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 Fetal hydronephrosis during pregnancy, antepartum, single or unspecified fetus     4. Maternal morbid obesity in third trimester, antepartum (CMS/HCC)     5. Nausea and vomiting during pregnancy prior to 22 weeks gestation     6. High risk multigravida in third trimester     7.  Tetrahydrocannabinol (THC) use disorder, mild, abuse     8. Maternal tobacco use in third trimester     Are being concerned about possible IUGR and particularly size of the large bones but ultrasound today is reassuring.  Patient is very much considering elective induction of right labor 39 weeks arrives good study reviewed.  Also reviewed the advantages of waiting spontaneous will onset of labor cervix is not very favorable.  Potential advantages with this reviewed at length.  Can consider further let me know in a week when seen back strict kick counts in interim  Taz Francois MD  1/11/2021  21:04 CST

## 2021-01-18 ENCOUNTER — ROUTINE PRENATAL (OUTPATIENT)
Dept: OBSTETRICS AND GYNECOLOGY | Facility: CLINIC | Age: 21
End: 2021-01-18

## 2021-01-18 VITALS — SYSTOLIC BLOOD PRESSURE: 116 MMHG | BODY MASS INDEX: 53.71 KG/M2 | DIASTOLIC BLOOD PRESSURE: 74 MMHG | WEIGHT: 275 LBS

## 2021-01-18 DIAGNOSIS — E66.01 MATERNAL MORBID OBESITY IN THIRD TRIMESTER, ANTEPARTUM (HCC): ICD-10-CM

## 2021-01-18 DIAGNOSIS — Z14.8 CARRIER OF GENETIC DISORDER: ICD-10-CM

## 2021-01-18 DIAGNOSIS — Z3A.39 39 WEEKS GESTATION OF PREGNANCY: Primary | ICD-10-CM

## 2021-01-18 DIAGNOSIS — O21.9 NAUSEA AND VOMITING DURING PREGNANCY PRIOR TO 22 WEEKS GESTATION: ICD-10-CM

## 2021-01-18 DIAGNOSIS — F12.10 TETRAHYDROCANNABINOL (THC) USE DISORDER, MILD, ABUSE: ICD-10-CM

## 2021-01-18 DIAGNOSIS — O99.333 MATERNAL TOBACCO USE IN THIRD TRIMESTER: ICD-10-CM

## 2021-01-18 DIAGNOSIS — O99.213 MATERNAL MORBID OBESITY IN THIRD TRIMESTER, ANTEPARTUM (HCC): ICD-10-CM

## 2021-01-18 DIAGNOSIS — O09.43 HIGH RISK MULTIGRAVIDA IN THIRD TRIMESTER: ICD-10-CM

## 2021-01-18 PROCEDURE — 99213 OFFICE O/P EST LOW 20 MIN: CPT | Performed by: OBSTETRICS & GYNECOLOGY

## 2021-01-19 PROBLEM — Z3A.39 39 WEEKS GESTATION OF PREGNANCY: Status: ACTIVE | Noted: 2021-01-19

## 2021-01-20 NOTE — PROGRESS NOTES
CC: Prenatal visit    Ruth Mancini is a 20 y.o.  at 39w1d.  Doing well.  Denies contractions, LOF, or VB.  Reports good FM.    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 Partner scn recommended         Fetal hydronephrosis in pregnancy, antepartum condition 2020 by Sheela Fitzpatrick MD No    Overview Signed 2020 12:16 PM by Sheela Fitzpatrick MD     RT 5.9mm         High risk multigravida in third trimester 2020 by Misti Mondragon APRN No    Maternal morbid obesity in third trimester, antepartum (CMS/HCC) 2020 by Misti Mondragon APRN No    Nausea and vomiting during pregnancy prior to 22 weeks gestation 2020 by Misti Mondragon APRN No    Tetrahydrocannabinol (THC) use disorder, mild, abuse 2020 by Misti Mondragon APRN No    Maternal tobacco use in third trimester 2020 by Misti Mondragon APRN No    Trichomonal vaginitis during pregnancy in second trimester 2020 by Misti Mondragon APRN 2020 by Sheela Fitzpatrick MD          A/P: Ruth Mancini is a 20 y.o.  at 39w1d.     - Reviewed COVID-19 visitation policy  - Reviewed COVID-19 precautions     Diagnosis Plan   1. 39 weeks gestation of pregnancy   again discussed with patient there is been concern earlier along for possible IUGR work-up for genetic causes with cell free DNA had been negative.  She understands limitations of this.  There have been concerns about long bones being short.  But this is largely resolved.  Discussed advantages of waiting  spontaneous onset of labor however she wants induction of labor unfortunately cervix is not very favorable understood understands risk of inducing labor in this situation including risk of hypertonic contractions damage mother baby increased risk of  section she wants to proceed please see H&P that has been dictated for hospital earlier   2. 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 Maternal morbid obesity in third trimester, antepartum (CMS/HCC)     4. Nausea and vomiting during pregnancy prior to 22 weeks gestation     5. High risk multigravida in third trimester     6. Tetrahydrocannabinol (THC) use disorder, mild, abuse     7. Maternal tobacco use in third trimester       Taz Francois MD  2021  19:36 CST

## 2021-01-21 ENCOUNTER — HOSPITAL ENCOUNTER (INPATIENT)
Dept: LABOR AND DELIVERY | Facility: HOSPITAL | Age: 21
Discharge: HOME | End: 2021-01-21

## 2021-01-21 ENCOUNTER — HOSPITAL ENCOUNTER (INPATIENT)
Facility: HOSPITAL | Age: 21
LOS: 4 days | Discharge: HOME | End: 2021-01-25
Attending: OBSTETRICS & GYNECOLOGY | Admitting: OBSTETRICS & GYNECOLOGY

## 2021-01-21 ENCOUNTER — ANESTHESIA EVENT (OUTPATIENT)
Dept: LABOR AND DELIVERY | Facility: HOSPITAL | Age: 21
End: 2021-01-21

## 2021-01-21 ENCOUNTER — ANESTHESIA (OUTPATIENT)
Dept: LABOR AND DELIVERY | Facility: HOSPITAL | Age: 21
End: 2021-01-21

## 2021-01-21 DIAGNOSIS — O36.8390 FETAL TACHYCARDIA AFFECTING MANAGEMENT OF MOTHER: Primary | ICD-10-CM

## 2021-01-21 DIAGNOSIS — Z98.890 POSTOPERATIVE STATE: ICD-10-CM

## 2021-01-21 DIAGNOSIS — Z3A.39 39 WEEKS GESTATION OF PREGNANCY: ICD-10-CM

## 2021-01-21 LAB
ABO GROUP BLD: NORMAL
AMPHET+METHAMPHET UR QL: NEGATIVE
AMPHETAMINES UR QL: NEGATIVE
BARBITURATES UR QL SCN: NEGATIVE
BENZODIAZ UR QL SCN: NEGATIVE
BLD GP AB SCN SERPL QL: NEGATIVE
BUPRENORPHINE SERPL-MCNC: NEGATIVE NG/ML
CANNABINOIDS SERPL QL: NEGATIVE
COCAINE UR QL: NEGATIVE
DEPRECATED RDW RBC AUTO: 37.5 FL (ref 37–54)
ERYTHROCYTE [DISTWIDTH] IN BLOOD BY AUTOMATED COUNT: 12.4 % (ref 12.3–15.4)
HCT VFR BLD AUTO: 35.1 % (ref 34–46.6)
HGB BLD-MCNC: 12.7 G/DL (ref 12–15.9)
Lab: NORMAL
MCH RBC QN AUTO: 30.7 PG (ref 26.6–33)
MCHC RBC AUTO-ENTMCNC: 36.2 G/DL (ref 31.5–35.7)
MCV RBC AUTO: 84.8 FL (ref 79–97)
METHADONE UR QL SCN: NEGATIVE
OPIATES UR QL: NEGATIVE
OXYCODONE UR QL SCN: NEGATIVE
PCP UR QL SCN: NEGATIVE
PLATELET # BLD AUTO: 300 10*3/MM3 (ref 140–450)
PMV BLD AUTO: 8.9 FL (ref 6–12)
PROPOXYPH UR QL: NEGATIVE
RBC # BLD AUTO: 4.14 10*6/MM3 (ref 3.77–5.28)
RH BLD: POSITIVE
T&S EXPIRATION DATE: NORMAL
TRICYCLICS UR QL SCN: NEGATIVE
WBC # BLD AUTO: 13.18 10*3/MM3 (ref 3.4–10.8)

## 2021-01-21 PROCEDURE — 86901 BLOOD TYPING SEROLOGIC RH(D): CPT | Performed by: OBSTETRICS & GYNECOLOGY

## 2021-01-21 PROCEDURE — 86850 RBC ANTIBODY SCREEN: CPT | Performed by: OBSTETRICS & GYNECOLOGY

## 2021-01-21 PROCEDURE — 3E0P7VZ INTRODUCTION OF HORMONE INTO FEMALE REPRODUCTIVE, VIA NATURAL OR ARTIFICIAL OPENING: ICD-10-PCS | Performed by: OBSTETRICS & GYNECOLOGY

## 2021-01-21 PROCEDURE — 85027 COMPLETE CBC AUTOMATED: CPT | Performed by: OBSTETRICS & GYNECOLOGY

## 2021-01-21 PROCEDURE — 80306 DRUG TEST PRSMV INSTRMNT: CPT | Performed by: OBSTETRICS & GYNECOLOGY

## 2021-01-21 PROCEDURE — 86900 BLOOD TYPING SEROLOGIC ABO: CPT | Performed by: OBSTETRICS & GYNECOLOGY

## 2021-01-21 PROCEDURE — 59200 INSERT CERVICAL DILATOR: CPT | Performed by: OBSTETRICS & GYNECOLOGY

## 2021-01-21 RX ORDER — PROMETHAZINE HYDROCHLORIDE 12.5 MG/1
12.5 TABLET ORAL EVERY 6 HOURS PRN
Status: DISCONTINUED | OUTPATIENT
Start: 2021-01-21 | End: 2021-01-22

## 2021-01-21 RX ORDER — MISOPROSTOL 100 MCG
TABLET ORAL
Status: COMPLETED
Start: 2021-01-21 | End: 2021-01-21

## 2021-01-21 RX ORDER — SODIUM CHLORIDE 0.9 % (FLUSH) 0.9 %
3 SYRINGE (ML) INJECTION EVERY 12 HOURS SCHEDULED
Status: DISCONTINUED | OUTPATIENT
Start: 2021-01-21 | End: 2021-01-22

## 2021-01-21 RX ORDER — LIDOCAINE HYDROCHLORIDE 10 MG/ML
5 INJECTION, SOLUTION EPIDURAL; INFILTRATION; INTRACAUDAL; PERINEURAL AS NEEDED
Status: DISCONTINUED | OUTPATIENT
Start: 2021-01-21 | End: 2021-01-22

## 2021-01-21 RX ORDER — MISOPROSTOL 100 MCG
50 TABLET ORAL
Status: DISCONTINUED | OUTPATIENT
Start: 2021-01-21 | End: 2021-01-22

## 2021-01-21 RX ORDER — CARBOPROST TROMETHAMINE 250 UG/ML
250 INJECTION, SOLUTION INTRAMUSCULAR AS NEEDED
Status: DISCONTINUED | OUTPATIENT
Start: 2021-01-21 | End: 2021-01-22

## 2021-01-21 RX ORDER — SODIUM CHLORIDE 0.9 % (FLUSH) 0.9 %
3-10 SYRINGE (ML) INJECTION AS NEEDED
Status: DISCONTINUED | OUTPATIENT
Start: 2021-01-21 | End: 2021-01-22

## 2021-01-21 RX ORDER — MISOPROSTOL 100 MCG
25 TABLET ORAL ONCE
Status: COMPLETED | OUTPATIENT
Start: 2021-01-21 | End: 2021-01-21

## 2021-01-21 RX ORDER — PROMETHAZINE HYDROCHLORIDE 12.5 MG/1
12.5 SUPPOSITORY RECTAL EVERY 6 HOURS PRN
Status: DISCONTINUED | OUTPATIENT
Start: 2021-01-21 | End: 2021-01-22

## 2021-01-21 RX ORDER — MISOPROSTOL 200 UG/1
800 TABLET ORAL AS NEEDED
Status: DISCONTINUED | OUTPATIENT
Start: 2021-01-21 | End: 2021-01-22

## 2021-01-21 RX ORDER — SODIUM CHLORIDE, SODIUM LACTATE, POTASSIUM CHLORIDE, CALCIUM CHLORIDE 600; 310; 30; 20 MG/100ML; MG/100ML; MG/100ML; MG/100ML
125 INJECTION, SOLUTION INTRAVENOUS CONTINUOUS
Status: DISCONTINUED | OUTPATIENT
Start: 2021-01-21 | End: 2021-01-22

## 2021-01-21 RX ORDER — METHYLERGONOVINE MALEATE 0.2 MG/ML
200 INJECTION INTRAVENOUS ONCE AS NEEDED
Status: DISCONTINUED | OUTPATIENT
Start: 2021-01-21 | End: 2021-01-22

## 2021-01-21 RX ADMIN — SODIUM CHLORIDE, POTASSIUM CHLORIDE, SODIUM LACTATE AND CALCIUM CHLORIDE 125 ML/HR: 600; 310; 30; 20 INJECTION, SOLUTION INTRAVENOUS at 15:26

## 2021-01-21 RX ADMIN — SODIUM CHLORIDE, POTASSIUM CHLORIDE, SODIUM LACTATE AND CALCIUM CHLORIDE 125 ML/HR: 600; 310; 30; 20 INJECTION, SOLUTION INTRAVENOUS at 22:10

## 2021-01-21 RX ADMIN — MISOPROSTOL 50 MCG: 100 TABLET ORAL at 22:00

## 2021-01-21 RX ADMIN — SODIUM CHLORIDE, POTASSIUM CHLORIDE, SODIUM LACTATE AND CALCIUM CHLORIDE 125 ML/HR: 600; 310; 30; 20 INJECTION, SOLUTION INTRAVENOUS at 06:26

## 2021-01-21 RX ADMIN — MISOPROSTOL 50 MCG: 100 TABLET ORAL at 17:58

## 2021-01-21 RX ADMIN — MISOPROSTOL 25 MCG: 100 TABLET ORAL at 07:30

## 2021-01-21 RX ADMIN — MISOPROSTOL 50 MCG: 100 TABLET ORAL at 13:08

## 2021-01-22 PROBLEM — O36.8390 FETAL TACHYCARDIA AFFECTING MANAGEMENT OF MOTHER: Status: ACTIVE | Noted: 2021-01-21

## 2021-01-22 PROBLEM — Z98.891 S/P CESAREAN SECTION: Status: ACTIVE | Noted: 2021-01-22

## 2021-01-22 PROCEDURE — C1755 CATHETER, INTRASPINAL: HCPCS

## 2021-01-22 PROCEDURE — 25010000002 PHENYLEPHRINE PER 1 ML: Performed by: NURSE ANESTHETIST, CERTIFIED REGISTERED

## 2021-01-22 PROCEDURE — C1755 CATHETER, INTRASPINAL: HCPCS | Performed by: NURSE ANESTHETIST, CERTIFIED REGISTERED

## 2021-01-22 PROCEDURE — 25010000002 MORPHINE PER 10 MG: Performed by: NURSE ANESTHETIST, CERTIFIED REGISTERED

## 2021-01-22 PROCEDURE — 25010000002 AZITHROMYCIN 500 MG/250 ML: Performed by: OBSTETRICS & GYNECOLOGY

## 2021-01-22 PROCEDURE — 25010000002 ONDANSETRON PER 1 MG: Performed by: NURSE ANESTHETIST, CERTIFIED REGISTERED

## 2021-01-22 PROCEDURE — 59515 CESAREAN DELIVERY: CPT | Performed by: OBSTETRICS & GYNECOLOGY

## 2021-01-22 PROCEDURE — 25010000002 KETOROLAC TROMETHAMINE PER 15 MG: Performed by: OBSTETRICS & GYNECOLOGY

## 2021-01-22 PROCEDURE — 63710000001 PROMETHAZINE PER 12.5 MG: Performed by: OBSTETRICS & GYNECOLOGY

## 2021-01-22 PROCEDURE — 84156 ASSAY OF PROTEIN URINE: CPT | Performed by: OBSTETRICS & GYNECOLOGY

## 2021-01-22 PROCEDURE — 51703 INSERT BLADDER CATH COMPLEX: CPT

## 2021-01-22 PROCEDURE — 25010000002 FENTANYL CITRATE (PF) 250 MCG/5ML SOLUTION: Performed by: NURSE ANESTHETIST, CERTIFIED REGISTERED

## 2021-01-22 PROCEDURE — 88307 TISSUE EXAM BY PATHOLOGIST: CPT

## 2021-01-22 PROCEDURE — 59514 CESAREAN DELIVERY ONLY: CPT | Performed by: SPECIALIST/TECHNOLOGIST, OTHER

## 2021-01-22 PROCEDURE — 10H07YZ INSERTION OF OTHER DEVICE INTO PRODUCTS OF CONCEPTION, VIA NATURAL OR ARTIFICIAL OPENING: ICD-10-PCS | Performed by: OBSTETRICS & GYNECOLOGY

## 2021-01-22 PROCEDURE — 94799 UNLISTED PULMONARY SVC/PX: CPT

## 2021-01-22 PROCEDURE — 25010000002 BUTORPHANOL PER 1 MG: Performed by: OBSTETRICS & GYNECOLOGY

## 2021-01-22 PROCEDURE — 82570 ASSAY OF URINE CREATININE: CPT | Performed by: OBSTETRICS & GYNECOLOGY

## 2021-01-22 RX ORDER — METHYLERGONOVINE MALEATE 0.2 MG/ML
200 INJECTION INTRAVENOUS ONCE AS NEEDED
Status: DISCONTINUED | OUTPATIENT
Start: 2021-01-22 | End: 2021-01-22 | Stop reason: HOSPADM

## 2021-01-22 RX ORDER — LIDOCAINE HYDROCHLORIDE AND EPINEPHRINE 15; 5 MG/ML; UG/ML
INJECTION, SOLUTION EPIDURAL AS NEEDED
Status: DISCONTINUED | OUTPATIENT
Start: 2021-01-22 | End: 2021-01-22 | Stop reason: SURG

## 2021-01-22 RX ORDER — ALUMINA, MAGNESIA, AND SIMETHICONE 2400; 2400; 240 MG/30ML; MG/30ML; MG/30ML
15 SUSPENSION ORAL EVERY 4 HOURS PRN
Status: DISCONTINUED | OUTPATIENT
Start: 2021-01-22 | End: 2021-01-25 | Stop reason: HOSPADM

## 2021-01-22 RX ORDER — PROMETHAZINE HYDROCHLORIDE 12.5 MG/1
12.5 SUPPOSITORY RECTAL EVERY 6 HOURS PRN
Status: DISCONTINUED | OUTPATIENT
Start: 2021-01-22 | End: 2021-01-22 | Stop reason: HOSPADM

## 2021-01-22 RX ORDER — CARBOPROST TROMETHAMINE 250 UG/ML
250 INJECTION, SOLUTION INTRAMUSCULAR AS NEEDED
Status: DISCONTINUED | OUTPATIENT
Start: 2021-01-22 | End: 2021-01-22 | Stop reason: HOSPADM

## 2021-01-22 RX ORDER — LIDOCAINE HYDROCHLORIDE AND EPINEPHRINE BITARTRATE 20; .01 MG/ML; MG/ML
INJECTION, SOLUTION SUBCUTANEOUS AS NEEDED
Status: DISCONTINUED | OUTPATIENT
Start: 2021-01-22 | End: 2021-01-22 | Stop reason: SURG

## 2021-01-22 RX ORDER — OXYTOCIN/0.9 % SODIUM CHLORIDE 30/500 ML
650 PLASTIC BAG, INJECTION (ML) INTRAVENOUS ONCE
Status: DISCONTINUED | OUTPATIENT
Start: 2021-01-22 | End: 2021-01-25 | Stop reason: HOSPADM

## 2021-01-22 RX ORDER — OXYTOCIN/0.9 % SODIUM CHLORIDE 30/500 ML
650 PLASTIC BAG, INJECTION (ML) INTRAVENOUS ONCE
Status: COMPLETED | OUTPATIENT
Start: 2021-01-22 | End: 2021-01-22

## 2021-01-22 RX ORDER — SODIUM CHLORIDE 0.9 % (FLUSH) 0.9 %
3 SYRINGE (ML) INJECTION EVERY 12 HOURS SCHEDULED
Status: DISCONTINUED | OUTPATIENT
Start: 2021-01-22 | End: 2021-01-22

## 2021-01-22 RX ORDER — PROMETHAZINE HYDROCHLORIDE 12.5 MG/1
12.5 TABLET ORAL EVERY 6 HOURS PRN
Status: DISCONTINUED | OUTPATIENT
Start: 2021-01-22 | End: 2021-01-22 | Stop reason: HOSPADM

## 2021-01-22 RX ORDER — DIPHENHYDRAMINE HCL 25 MG
25 CAPSULE ORAL EVERY 4 HOURS PRN
Status: DISCONTINUED | OUTPATIENT
Start: 2021-01-22 | End: 2021-01-25 | Stop reason: HOSPADM

## 2021-01-22 RX ORDER — CEFAZOLIN SODIUM IN 0.9 % NACL 3 G/100 ML
3 INTRAVENOUS SOLUTION, PIGGYBACK (ML) INTRAVENOUS ONCE
Status: DISCONTINUED | OUTPATIENT
Start: 2021-01-22 | End: 2021-01-22

## 2021-01-22 RX ORDER — LIDOCAINE HYDROCHLORIDE 10 MG/ML
5 INJECTION, SOLUTION EPIDURAL; INFILTRATION; INTRACAUDAL; PERINEURAL AS NEEDED
Status: DISCONTINUED | OUTPATIENT
Start: 2021-01-22 | End: 2021-01-22

## 2021-01-22 RX ORDER — MISOPROSTOL 200 UG/1
800 TABLET ORAL AS NEEDED
Status: DISCONTINUED | OUTPATIENT
Start: 2021-01-22 | End: 2021-01-22 | Stop reason: HOSPADM

## 2021-01-22 RX ORDER — OXYTOCIN/0.9 % SODIUM CHLORIDE 30/500 ML
85 PLASTIC BAG, INJECTION (ML) INTRAVENOUS ONCE
Status: COMPLETED | OUTPATIENT
Start: 2021-01-22 | End: 2021-01-22

## 2021-01-22 RX ORDER — DIPHENHYDRAMINE HYDROCHLORIDE 50 MG/ML
25 INJECTION INTRAMUSCULAR; INTRAVENOUS EVERY 4 HOURS PRN
Status: DISCONTINUED | OUTPATIENT
Start: 2021-01-22 | End: 2021-01-25 | Stop reason: HOSPADM

## 2021-01-22 RX ORDER — ONDANSETRON 2 MG/ML
4 INJECTION INTRAMUSCULAR; INTRAVENOUS ONCE AS NEEDED
Status: ACTIVE | OUTPATIENT
Start: 2021-01-22 | End: 2021-01-23

## 2021-01-22 RX ORDER — ONDANSETRON 2 MG/ML
INJECTION INTRAMUSCULAR; INTRAVENOUS AS NEEDED
Status: DISCONTINUED | OUTPATIENT
Start: 2021-01-22 | End: 2021-01-22 | Stop reason: SURG

## 2021-01-22 RX ORDER — HYDROCORTISONE 25 MG/G
CREAM TOPICAL 3 TIMES DAILY PRN
Status: DISCONTINUED | OUTPATIENT
Start: 2021-01-22 | End: 2021-01-25 | Stop reason: HOSPADM

## 2021-01-22 RX ORDER — NALOXONE HCL 0.4 MG/ML
0.2 VIAL (ML) INJECTION
Status: DISCONTINUED | OUTPATIENT
Start: 2021-01-22 | End: 2021-01-25 | Stop reason: HOSPADM

## 2021-01-22 RX ORDER — FENTANYL CITRATE 50 UG/ML
INJECTION, SOLUTION INTRAMUSCULAR; INTRAVENOUS AS NEEDED
Status: DISCONTINUED | OUTPATIENT
Start: 2021-01-22 | End: 2021-01-22 | Stop reason: SURG

## 2021-01-22 RX ORDER — ONDANSETRON 4 MG/1
4 TABLET, FILM COATED ORAL EVERY 6 HOURS PRN
Status: DISCONTINUED | OUTPATIENT
Start: 2021-01-22 | End: 2021-01-22 | Stop reason: HOSPADM

## 2021-01-22 RX ORDER — PRENATAL VIT/IRON FUM/FOLIC AC 27MG-0.8MG
1 TABLET ORAL DAILY
Status: DISCONTINUED | OUTPATIENT
Start: 2021-01-23 | End: 2021-01-25 | Stop reason: HOSPADM

## 2021-01-22 RX ORDER — ONDANSETRON 2 MG/ML
4 INJECTION INTRAMUSCULAR; INTRAVENOUS EVERY 6 HOURS PRN
Status: DISCONTINUED | OUTPATIENT
Start: 2021-01-22 | End: 2021-01-22 | Stop reason: HOSPADM

## 2021-01-22 RX ORDER — MORPHINE SULFATE 1 MG/ML
INJECTION, SOLUTION EPIDURAL; INTRATHECAL; INTRAVENOUS AS NEEDED
Status: DISCONTINUED | OUTPATIENT
Start: 2021-01-22 | End: 2021-01-22 | Stop reason: SURG

## 2021-01-22 RX ORDER — SIMETHICONE 80 MG
80 TABLET,CHEWABLE ORAL 4 TIMES DAILY PRN
Status: DISCONTINUED | OUTPATIENT
Start: 2021-01-22 | End: 2021-01-25 | Stop reason: HOSPADM

## 2021-01-22 RX ORDER — OXYCODONE HYDROCHLORIDE 5 MG/1
10 TABLET ORAL EVERY 6 HOURS PRN
Status: DISCONTINUED | OUTPATIENT
Start: 2021-01-22 | End: 2021-01-25 | Stop reason: HOSPADM

## 2021-01-22 RX ORDER — SODIUM CHLORIDE 0.9 % (FLUSH) 0.9 %
3-10 SYRINGE (ML) INJECTION AS NEEDED
Status: DISCONTINUED | OUTPATIENT
Start: 2021-01-22 | End: 2021-01-22

## 2021-01-22 RX ORDER — AMOXICILLIN 250 MG
1 CAPSULE ORAL DAILY PRN
Status: DISCONTINUED | OUTPATIENT
Start: 2021-01-22 | End: 2021-01-25 | Stop reason: HOSPADM

## 2021-01-22 RX ORDER — IBUPROFEN 800 MG/1
800 TABLET ORAL EVERY 8 HOURS SCHEDULED
Status: DISCONTINUED | OUTPATIENT
Start: 2021-01-23 | End: 2021-01-25 | Stop reason: HOSPADM

## 2021-01-22 RX ORDER — LANOLIN 100 %
OINTMENT (GRAM) TOPICAL
Status: DISCONTINUED | OUTPATIENT
Start: 2021-01-22 | End: 2021-01-25 | Stop reason: HOSPADM

## 2021-01-22 RX ORDER — KETOROLAC TROMETHAMINE 30 MG/ML
30 INJECTION, SOLUTION INTRAMUSCULAR; INTRAVENOUS EVERY 6 HOURS
Status: COMPLETED | OUTPATIENT
Start: 2021-01-22 | End: 2021-01-23

## 2021-01-22 RX ORDER — TRISODIUM CITRATE DIHYDRATE AND CITRIC ACID MONOHYDRATE 500; 334 MG/5ML; MG/5ML
30 SOLUTION ORAL ONCE
Status: COMPLETED | OUTPATIENT
Start: 2021-01-22 | End: 2021-01-22

## 2021-01-22 RX ORDER — TRISODIUM CITRATE DIHYDRATE AND CITRIC ACID MONOHYDRATE 500; 334 MG/5ML; MG/5ML
30 SOLUTION ORAL ONCE
Status: DISCONTINUED | OUTPATIENT
Start: 2021-01-22 | End: 2021-01-25 | Stop reason: HOSPADM

## 2021-01-22 RX ORDER — LIDOCAINE HYDROCHLORIDE 20 MG/ML
INJECTION, SOLUTION EPIDURAL; INFILTRATION; INTRACAUDAL; PERINEURAL AS NEEDED
Status: DISCONTINUED | OUTPATIENT
Start: 2021-01-22 | End: 2021-01-22 | Stop reason: SURG

## 2021-01-22 RX ORDER — MISOPROSTOL 100 MCG
50 TABLET ORAL
Status: COMPLETED | OUTPATIENT
Start: 2021-01-22 | End: 2021-01-22

## 2021-01-22 RX ORDER — CEFAZOLIN SODIUM IN 0.9 % NACL 3 G/100 ML
3 INTRAVENOUS SOLUTION, PIGGYBACK (ML) INTRAVENOUS ONCE
Status: COMPLETED | OUTPATIENT
Start: 2021-01-22 | End: 2021-01-22

## 2021-01-22 RX ORDER — OXYTOCIN/0.9 % SODIUM CHLORIDE 30/500 ML
2-20 PLASTIC BAG, INJECTION (ML) INTRAVENOUS
Status: DISCONTINUED | OUTPATIENT
Start: 2021-01-22 | End: 2021-01-25 | Stop reason: HOSPADM

## 2021-01-22 RX ORDER — TRISODIUM CITRATE DIHYDRATE AND CITRIC ACID MONOHYDRATE 500; 334 MG/5ML; MG/5ML
SOLUTION ORAL
Status: COMPLETED
Start: 2021-01-22 | End: 2021-01-22

## 2021-01-22 RX ORDER — OXYTOCIN/0.9 % SODIUM CHLORIDE 30/500 ML
85 PLASTIC BAG, INJECTION (ML) INTRAVENOUS ONCE
Status: DISCONTINUED | OUTPATIENT
Start: 2021-01-22 | End: 2021-01-25 | Stop reason: HOSPADM

## 2021-01-22 RX ADMIN — MISOPROSTOL 50 MCG: 100 TABLET ORAL at 02:09

## 2021-01-22 RX ADMIN — Medication 8 ML/HR: at 06:20

## 2021-01-22 RX ADMIN — OXYTOCIN-SODIUM CHLORIDE 0.9% IV SOLN 30 UNIT/500ML 85 ML/HR: 30-0.9/5 SOLUTION at 20:56

## 2021-01-22 RX ADMIN — PHENYLEPHRINE HYDROCHLORIDE 100 MCG: 10 INJECTION INTRAVENOUS at 19:47

## 2021-01-22 RX ADMIN — PROMETHAZINE HYDROCHLORIDE 12.5 MG: 12.5 TABLET ORAL at 00:49

## 2021-01-22 RX ADMIN — MORPHINE SULFATE 3 MG: 1 INJECTION EPIDURAL; INTRATHECAL; INTRAVENOUS at 19:26

## 2021-01-22 RX ADMIN — PHENYLEPHRINE HYDROCHLORIDE 100 MCG: 10 INJECTION INTRAVENOUS at 19:51

## 2021-01-22 RX ADMIN — TRISODIUM CITRATE DIHYDRATE AND CITRIC ACID MONOHYDRATE 30 ML: 500; 334 SOLUTION ORAL at 19:05

## 2021-01-22 RX ADMIN — MORPHINE SULFATE 5 MG: 1 INJECTION EPIDURAL; INTRATHECAL; INTRAVENOUS at 19:51

## 2021-01-22 RX ADMIN — ONDANSETRON HYDROCHLORIDE 4 MG: 2 INJECTION INTRAMUSCULAR; INTRAVENOUS at 19:55

## 2021-01-22 RX ADMIN — SODIUM CITRATE AND CITRIC ACID MONOHYDRATE 30 ML: 500; 334 SOLUTION ORAL at 19:05

## 2021-01-22 RX ADMIN — LIDOCAINE HYDROCHLORIDE 10 ML: 20 INJECTION, SOLUTION EPIDURAL; INFILTRATION; INTRACAUDAL; PERINEURAL at 19:21

## 2021-01-22 RX ADMIN — FENTANYL CITRATE 250 MCG: 50 INJECTION, SOLUTION INTRAMUSCULAR; INTRAVENOUS at 06:20

## 2021-01-22 RX ADMIN — BUTORPHANOL TARTRATE 2 MG: 2 INJECTION, SOLUTION INTRAMUSCULAR; INTRAVENOUS at 00:49

## 2021-01-22 RX ADMIN — PHENYLEPHRINE HYDROCHLORIDE 100 MCG: 10 INJECTION INTRAVENOUS at 19:31

## 2021-01-22 RX ADMIN — OXYTOCIN-SODIUM CHLORIDE 0.9% IV SOLN 30 UNIT/500ML 30 UNITS: 30-0.9/5 SOLUTION at 19:29

## 2021-01-22 RX ADMIN — OXYTOCIN-SODIUM CHLORIDE 0.9% IV SOLN 30 UNIT/500ML 650 ML/HR: 30-0.9/5 SOLUTION at 20:10

## 2021-01-22 RX ADMIN — BUTORPHANOL TARTRATE 2 MG: 2 INJECTION, SOLUTION INTRAMUSCULAR; INTRAVENOUS at 02:59

## 2021-01-22 RX ADMIN — LIDOCAINE HYDROCHLORIDE AND EPINEPHRINE 10 ML: 20; 10 INJECTION, SOLUTION INFILTRATION; PERINEURAL at 19:21

## 2021-01-22 RX ADMIN — LIDOCAINE HYDROCHLORIDE AND EPINEPHRINE 3 ML: 15; 5 INJECTION, SOLUTION EPIDURAL at 06:15

## 2021-01-22 RX ADMIN — OXYTOCIN-SODIUM CHLORIDE 0.9% IV SOLN 30 UNIT/500ML 30 UNITS: 30-0.9/5 SOLUTION at 20:07

## 2021-01-22 RX ADMIN — MORPHINE SULFATE 2 MG: 1 INJECTION EPIDURAL; INTRATHECAL; INTRAVENOUS at 20:05

## 2021-01-22 RX ADMIN — AZITHROMYCIN 500 MG: 500 INJECTION, POWDER, LYOPHILIZED, FOR SOLUTION INTRAVENOUS at 19:31

## 2021-01-22 RX ADMIN — SODIUM CHLORIDE, POTASSIUM CHLORIDE, SODIUM LACTATE AND CALCIUM CHLORIDE 125 ML/HR: 600; 310; 30; 20 INJECTION, SOLUTION INTRAVENOUS at 17:59

## 2021-01-22 RX ADMIN — PHENYLEPHRINE HYDROCHLORIDE 100 MCG: 10 INJECTION INTRAVENOUS at 19:25

## 2021-01-22 RX ADMIN — CEFAZOLIN 3 G: 1 INJECTION, POWDER, FOR SOLUTION INTRAMUSCULAR; INTRAVENOUS; PARENTERAL at 19:22

## 2021-01-22 RX ADMIN — PHENYLEPHRINE HYDROCHLORIDE 100 MCG: 10 INJECTION INTRAVENOUS at 19:36

## 2021-01-22 RX ADMIN — KETOROLAC TROMETHAMINE 30 MG: 30 INJECTION, SOLUTION INTRAMUSCULAR at 23:07

## 2021-01-22 RX ADMIN — SODIUM CHLORIDE, POTASSIUM CHLORIDE, SODIUM LACTATE AND CALCIUM CHLORIDE 125 ML/HR: 600; 310; 30; 20 INJECTION, SOLUTION INTRAVENOUS at 09:30

## 2021-01-22 RX ADMIN — OXYTOCIN-SODIUM CHLORIDE 0.9% IV SOLN 30 UNIT/500ML 2 MILLI-UNITS/MIN: 30-0.9/5 SOLUTION at 08:01

## 2021-01-22 RX ADMIN — LIDOCAINE HYDROCHLORIDE AND EPINEPHRINE 5 ML: 20; 10 INJECTION, SOLUTION INFILTRATION; PERINEURAL at 19:25

## 2021-01-22 RX ADMIN — LIDOCAINE HYDROCHLORIDE 5 ML: 20 INJECTION, SOLUTION EPIDURAL; INFILTRATION; INTRACAUDAL; PERINEURAL at 19:25

## 2021-01-22 NOTE — ANESTHESIA PROCEDURE NOTES
Labor Epidural      Patient location during procedure: OB  Performed By  CRNA: Lisandro Ag CRNA  Preanesthetic Checklist  Completed: patient identified, site marked, surgical consent, pre-op evaluation, timeout performed, IV checked, risks and benefits discussed and monitors and equipment checked  Prep:  Pt Position:sitting  Sterile Tech:gloves, cap, gown, mask and sterile barrier  Prep:DuraPrep  Monitoring:blood pressure monitoring and continuous pulse oximetry  Epidural Block Procedure:  Approach:midline  Guidance:landmark technique and palpation technique  Location:L2-L3  Needle Type:Tuohy  Needle Gauge:17 G  Loss of Resistance Medium: air  Loss of Resistance: 9cm  Cath Depth at skin:15 cm  Paresthesia: none  Aspiration:negative  Test Dose:negative  Number of Attempts: 1  Post Assessment:  Dressing:occlusive dressing applied and secured with tape  Pt Tolerance:patient tolerated the procedure well with no apparent complications  Complications:no

## 2021-01-22 NOTE — ANESTHESIA PREPROCEDURE EVALUATION
Anesthesia Evaluation     NPO Solid Status: > 8 hours  NPO Liquid Status: > 2 hours           Airway   Mallampati: II  TM distance: >3 FB  Neck ROM: full  no difficulty expected  Dental - normal exam     Pulmonary - normal exam   (+) asthma,recent URI,   Cardiovascular - normal exam        Neuro/Psych  (+) headaches, psychiatric history,     GI/Hepatic/Renal/Endo    (+) morbid obesity,  renal disease,     Musculoskeletal     (+) neck pain,   Abdominal    Substance History      OB/GYN    (+) Pregnant,         Other                        Anesthesia Plan    ASA 3     epidural       Anesthetic plan, all risks, benefits, and alternatives have been provided, discussed and informed consent has been obtained with: patient.

## 2021-01-23 LAB
BASOPHILS # BLD AUTO: 0.04 10*3/MM3 (ref 0–0.2)
BASOPHILS NFR BLD AUTO: 0.2 % (ref 0–1.5)
CREAT UR-MCNC: 76.1 MG/DL
DEPRECATED RDW RBC AUTO: 39.3 FL (ref 37–54)
EOSINOPHIL # BLD AUTO: 0.03 10*3/MM3 (ref 0–0.4)
EOSINOPHIL NFR BLD AUTO: 0.2 % (ref 0.3–6.2)
ERYTHROCYTE [DISTWIDTH] IN BLOOD BY AUTOMATED COUNT: 12.4 % (ref 12.3–15.4)
HCT VFR BLD AUTO: 28.7 % (ref 34–46.6)
HGB BLD-MCNC: 10 G/DL (ref 12–15.9)
IMM GRANULOCYTES # BLD AUTO: 0.18 10*3/MM3 (ref 0–0.05)
IMM GRANULOCYTES NFR BLD AUTO: 0.9 % (ref 0–0.5)
LYMPHOCYTES # BLD AUTO: 1.4 10*3/MM3 (ref 0.7–3.1)
LYMPHOCYTES NFR BLD AUTO: 7.1 % (ref 19.6–45.3)
MCH RBC QN AUTO: 30.4 PG (ref 26.6–33)
MCHC RBC AUTO-ENTMCNC: 34.8 G/DL (ref 31.5–35.7)
MCV RBC AUTO: 87.2 FL (ref 79–97)
MONOCYTES # BLD AUTO: 1.92 10*3/MM3 (ref 0.1–0.9)
MONOCYTES NFR BLD AUTO: 9.7 % (ref 5–12)
NEUTROPHILS NFR BLD AUTO: 16.27 10*3/MM3 (ref 1.7–7)
NEUTROPHILS NFR BLD AUTO: 81.9 % (ref 42.7–76)
NRBC BLD AUTO-RTO: 0 /100 WBC (ref 0–0.2)
PLATELET # BLD AUTO: 241 10*3/MM3 (ref 140–450)
PMV BLD AUTO: 9.3 FL (ref 6–12)
PROT UR-MCNC: 64 MG/DL
PROT/CREAT UR: 841 MG/G CREA (ref 0–200)
RBC # BLD AUTO: 3.29 10*6/MM3 (ref 3.77–5.28)
WBC # BLD AUTO: 19.84 10*3/MM3 (ref 3.4–10.8)

## 2021-01-23 PROCEDURE — 85025 COMPLETE CBC W/AUTO DIFF WBC: CPT | Performed by: OBSTETRICS & GYNECOLOGY

## 2021-01-23 PROCEDURE — 0503F POSTPARTUM CARE VISIT: CPT | Performed by: OBSTETRICS & GYNECOLOGY

## 2021-01-23 PROCEDURE — 25010000002 ENOXAPARIN PER 10 MG: Performed by: OBSTETRICS & GYNECOLOGY

## 2021-01-23 PROCEDURE — 25010000002 DIPHENHYDRAMINE PER 50 MG: Performed by: NURSE ANESTHETIST, CERTIFIED REGISTERED

## 2021-01-23 PROCEDURE — 94799 UNLISTED PULMONARY SVC/PX: CPT

## 2021-01-23 PROCEDURE — 25010000002 KETOROLAC TROMETHAMINE PER 15 MG: Performed by: OBSTETRICS & GYNECOLOGY

## 2021-01-23 RX ORDER — SODIUM CHLORIDE, SODIUM LACTATE, POTASSIUM CHLORIDE, CALCIUM CHLORIDE 600; 310; 30; 20 MG/100ML; MG/100ML; MG/100ML; MG/100ML
125 INJECTION, SOLUTION INTRAVENOUS CONTINUOUS
Status: DISCONTINUED | OUTPATIENT
Start: 2021-01-23 | End: 2021-01-25 | Stop reason: HOSPADM

## 2021-01-23 RX ADMIN — KETOROLAC TROMETHAMINE 30 MG: 30 INJECTION, SOLUTION INTRAMUSCULAR at 12:59

## 2021-01-23 RX ADMIN — OXYCODONE HYDROCHLORIDE 10 MG: 5 TABLET ORAL at 17:59

## 2021-01-23 RX ADMIN — KETOROLAC TROMETHAMINE 30 MG: 30 INJECTION, SOLUTION INTRAMUSCULAR at 05:20

## 2021-01-23 RX ADMIN — DIPHENHYDRAMINE HYDROCHLORIDE 25 MG: 50 INJECTION INTRAMUSCULAR; INTRAVENOUS at 00:40

## 2021-01-23 RX ADMIN — KETOROLAC TROMETHAMINE 30 MG: 30 INJECTION, SOLUTION INTRAMUSCULAR at 20:13

## 2021-01-23 RX ADMIN — PRENATAL VIT W/ FE FUMARATE-FA TAB 27-0.8 MG 1 TABLET: 27-0.8 TAB at 08:37

## 2021-01-23 RX ADMIN — OXYCODONE HYDROCHLORIDE 10 MG: 5 TABLET ORAL at 08:38

## 2021-01-23 RX ADMIN — SODIUM CHLORIDE, POTASSIUM CHLORIDE, SODIUM LACTATE AND CALCIUM CHLORIDE 125 ML/HR: 600; 310; 30; 20 INJECTION, SOLUTION INTRAVENOUS at 02:11

## 2021-01-23 RX ADMIN — ENOXAPARIN SODIUM 40 MG: 40 INJECTION SUBCUTANEOUS at 22:18

## 2021-01-23 NOTE — ANESTHESIA POSTPROCEDURE EVALUATION
Patient: Ruth Mancini    Procedure Summary     Date: 01/22/21 Room / Location:     Anesthesia Start: 0602 Anesthesia Stop: 2028    Procedure: LABOR ANALGESIA Diagnosis:     Scheduled Providers:  Provider: Kar Huitron CRNA    Anesthesia Type: epidural ASA Status: 3          Anesthesia Type: epidural    Vitals  Vitals Value Taken Time   /64 01/22/21 1847   Temp 98.8 °F (37.1 °C) 01/22/21 1800   Pulse 130 01/22/21 1847   Resp 18 01/22/21 1716   SpO2 99 % 01/22/21 1716   Vitals shown include unvalidated device data.        Post Anesthesia Care and Evaluation    Patient location during evaluation: PACU  Patient participation: complete - patient participated  Level of consciousness: awake and alert  Pain score: 1  Pain management: adequate  Airway patency: patent  Anesthetic complications: No anesthetic complications  PONV Status: none  Cardiovascular status: acceptable  Respiratory status: acceptable  Hydration status: acceptable  Post Neuraxial Block status: Motor and sensory function returned to baseline

## 2021-01-24 PROCEDURE — 0503F POSTPARTUM CARE VISIT: CPT | Performed by: OBSTETRICS & GYNECOLOGY

## 2021-01-24 PROCEDURE — 25010000002 ENOXAPARIN PER 10 MG: Performed by: OBSTETRICS & GYNECOLOGY

## 2021-01-24 RX ADMIN — IBUPROFEN 800 MG: 800 TABLET, FILM COATED ORAL at 02:32

## 2021-01-24 RX ADMIN — ALUMINUM HYDROXIDE, MAGNESIUM HYDROXIDE, AND DIMETHICONE 15 ML: 400; 400; 40 SUSPENSION ORAL at 14:30

## 2021-01-24 RX ADMIN — PRENATAL VIT W/ FE FUMARATE-FA TAB 27-0.8 MG 1 TABLET: 27-0.8 TAB at 08:30

## 2021-01-24 RX ADMIN — ENOXAPARIN SODIUM 40 MG: 40 INJECTION SUBCUTANEOUS at 20:45

## 2021-01-24 RX ADMIN — ENOXAPARIN SODIUM 40 MG: 40 INJECTION SUBCUTANEOUS at 08:30

## 2021-01-24 RX ADMIN — IBUPROFEN 800 MG: 800 TABLET, FILM COATED ORAL at 18:40

## 2021-01-24 RX ADMIN — OXYCODONE HYDROCHLORIDE 10 MG: 5 TABLET ORAL at 20:45

## 2021-01-24 RX ADMIN — ALUMINUM HYDROXIDE, MAGNESIUM HYDROXIDE, AND DIMETHICONE 15 ML: 400; 400; 40 SUSPENSION ORAL at 18:51

## 2021-01-24 RX ADMIN — OXYCODONE HYDROCHLORIDE 10 MG: 5 TABLET ORAL at 14:30

## 2021-01-24 RX ADMIN — OXYCODONE HYDROCHLORIDE 10 MG: 5 TABLET ORAL at 08:30

## 2021-01-24 RX ADMIN — OXYCODONE HYDROCHLORIDE 10 MG: 5 TABLET ORAL at 00:11

## 2021-01-24 RX ADMIN — IBUPROFEN 800 MG: 800 TABLET, FILM COATED ORAL at 13:11

## 2021-01-25 VITALS
DIASTOLIC BLOOD PRESSURE: 82 MMHG | TEMPERATURE: 98.4 F | WEIGHT: 275 LBS | BODY MASS INDEX: 53.71 KG/M2 | SYSTOLIC BLOOD PRESSURE: 128 MMHG | OXYGEN SATURATION: 100 % | HEART RATE: 86 BPM | RESPIRATION RATE: 18 BRPM

## 2021-01-25 PROCEDURE — 25010000002 ENOXAPARIN PER 10 MG: Performed by: OBSTETRICS & GYNECOLOGY

## 2021-01-25 RX ORDER — OXYCODONE HYDROCHLORIDE 10 MG/1
10 TABLET ORAL EVERY 6 HOURS PRN
Qty: 10 TABLET | Refills: 0 | Status: SHIPPED | OUTPATIENT
Start: 2021-01-25 | End: 2021-01-29

## 2021-01-25 RX ORDER — IBUPROFEN 800 MG/1
800 TABLET ORAL EVERY 8 HOURS SCHEDULED
Qty: 90 TABLET | Refills: 0 | Status: SHIPPED | OUTPATIENT
Start: 2021-01-25 | End: 2021-03-10

## 2021-01-25 RX ADMIN — IBUPROFEN 800 MG: 800 TABLET, FILM COATED ORAL at 09:47

## 2021-01-25 RX ADMIN — PRENATAL VIT W/ FE FUMARATE-FA TAB 27-0.8 MG 1 TABLET: 27-0.8 TAB at 09:47

## 2021-01-25 RX ADMIN — ENOXAPARIN SODIUM 40 MG: 40 INJECTION SUBCUTANEOUS at 09:47

## 2021-01-25 RX ADMIN — OXYCODONE HYDROCHLORIDE 10 MG: 5 TABLET ORAL at 12:25

## 2021-01-25 RX ADMIN — OXYCODONE HYDROCHLORIDE 10 MG: 5 TABLET ORAL at 02:33

## 2021-01-25 RX ADMIN — IBUPROFEN 800 MG: 800 TABLET, FILM COATED ORAL at 02:33

## 2021-01-27 ENCOUNTER — POSTPARTUM VISIT (OUTPATIENT)
Dept: OBSTETRICS AND GYNECOLOGY | Facility: CLINIC | Age: 21
End: 2021-01-27

## 2021-01-27 VITALS — WEIGHT: 249.6 LBS | SYSTOLIC BLOOD PRESSURE: 116 MMHG | DIASTOLIC BLOOD PRESSURE: 70 MMHG | BODY MASS INDEX: 48.75 KG/M2

## 2021-01-27 DIAGNOSIS — Z98.890 POSTOPERATIVE STATE: Primary | ICD-10-CM

## 2021-01-27 DIAGNOSIS — R30.0 DYSURIA: ICD-10-CM

## 2021-01-27 RX ORDER — SULFAMETHOXAZOLE AND TRIMETHOPRIM 800; 160 MG/1; MG/1
1 TABLET ORAL 2 TIMES DAILY
Qty: 28 TABLET | Refills: 0 | Status: SHIPPED | OUTPATIENT
Start: 2021-01-27 | End: 2021-03-10

## 2021-01-27 NOTE — PROGRESS NOTES
Ruth Mancini is a 20 y.o. y/o female.     Chief Complaint: Postop follow-up    HPI:   20 y.o. .  Patient's last menstrual period was 2020 (approximate)..  Patient is doing well only complaint is of some dysuria          Review of Systems     Constitutional: Denies night sweats    HENT: No hearing changes, denies ear pain    Eye: No eye pain; no foreign body in eye    Pulmonary: No hemoptysis    Cardiovascular: No claudication    GI: No hematemesis    Musculoskeletal: No arthralgias, no joint swelling    Endocrine: No polydipsia or polyuria    Hematologic: Denies any free bleeding    Psychiatric: Denies any delusions    The following portions of the patient's history were reviewed and updated as appropriate: allergies, current medications, past family history, past medical history, past social history, past surgical history and problem list.    Allergies   Allergen Reactions   • Naprosyn [Naproxen] Palpitations     twitching        Outpatient Medications Prior to Visit   Medication Sig Dispense Refill   • ibuprofen (ADVIL,MOTRIN) 800 MG tablet Take 1 tablet by mouth Every 8 (Eight) Hours. 90 tablet 0   • oxyCODONE (ROXICODONE) 10 MG tablet Take 1 tablet by mouth Every 6 (Six) Hours As Needed for Severe Pain  for up to 4 days. 10 tablet 0   • Prenatal Vit-Fe Fumarate-FA (prenatal vitamin 28-0.8) 28-0.8 MG tablet tablet Take 1 tablet by mouth Daily.     • sennosides-docusate (Senokot S) 8.6-50 MG per tablet Take 1 tablet by mouth Daily As Needed for Constipation. 30 tablet 1     No facility-administered medications prior to visit.         The patient has a family history of   Family History   Problem Relation Age of Onset   • Ovarian cysts Mother    • Anemia Mother    • Diabetes Mother    • Other Brother         arthrogryposis   • Alzheimer's disease Other    • Asthma Other    • ADD / ADHD Other    • Bipolar disorder Other    • Breast cancer Other    • Depression Other    • Diabetes Other    •  Hyperlipidemia Other    • Hypertension Other    • Lung cancer Other    • Mental illness Other    • Ovarian cancer Other    • Rheum arthritis Other    • Stroke Other    • Other Other         toxemia of pregnancy   • Psoriasis Other    • Samayoa-Kendall syndrome Other    • Diabetes Father    • Hypertension Father    • No Known Problems Paternal Grandfather    • Heart disease Paternal Grandmother    • Hypertension Paternal Grandmother    • Hypertension Maternal Grandmother    • Diabetes Maternal Grandmother    • Hyperthyroidism Maternal Grandmother    • Heart attack Maternal Grandmother    • Samayoa-Kendall syndrome Maternal Grandmother         Past Medical History:   Diagnosis Date   • Allergic asthma     IgE mediated   • Allergic conjunctivitis    • Allergic rhinitis    • Allergic rhinitis due to pollen    • Astigmatism    • Chlamydia    • Common cold    • Conjunctivitis    • Cough    • Depression    • Diarrhea    • Disorder of skin    • Dysfunction of eustachian tube    • Dysmenorrhea    • Exanthematous disorder    • Food poisoning    • Headache    • Herpes    • Hyperglycemia     mom says she is borderline diabetic   • Ingrowing nail    • Insect bite     nonvenomous   • Intrinsic asthma without status asthmaticus    • Irregular periods    • Knee pain     patellofemoral syndrome   • Migraine    • Nausea and vomiting    • Need for immunization against influenza    • Otalgia, right ear    • Pain in right arm     bruising right forearm   • Pain in throat    • Pain in toe    • Pharyngitis    • Rhinorrhea     posterior   • Rib pain     actually intercostal spasm   • Smoker    • Substance abuse (CMS/HCC)    • Tick bite     history of tick in right ear with abrasion of the ear canal   • Trichomonal vaginitis in pregnancy in first trimester 2020   • Upper respiratory infection    • Wheezing         OB History        2    Para   1    Term   1            AB   1    Living   1       SAB   1    TAB         Ectopic        Molar        Multiple   0    Live Births   1                 Social History     Socioeconomic History   • Marital status: Single     Spouse name: Not on file   • Number of children: Not on file   • Years of education: Not on file   • Highest education level: Not on file   Tobacco Use   • Smoking status: Current Every Day Smoker     Packs/day: 0.25     Types: Cigarettes   • Smokeless tobacco: Never Used   Substance and Sexual Activity   • Alcohol use: Not Currently   • Drug use: Yes     Types: Marijuana   • Sexual activity: Yes     Partners: Male     Comment: last pap smear 19 negative        Past Surgical History:   Procedure Laterality Date   •  SECTION N/A 2021    Procedure:  SECTION PRIMARY;  Surgeon: Taz Francois MD;  Location: [facility] LABOR DELIVERY;  Service: Obstetrics/Gynecology;  Laterality: N/A;   • NO PAST SURGERIES     • OTHER SURGICAL HISTORY  2015    avulsion of nail plate        Patient Active Problem List   Diagnosis   • High risk multigravida in third trimester   • Maternal morbid obesity in third trimester, antepartum (CMS/HCC)   • Nausea and vomiting during pregnancy prior to 22 weeks gestation   • Tetrahydrocannabinol (THC) use disorder, mild, abuse   • Maternal tobacco use in third trimester   • Poor fetal growth affecting management of mother in first trimester   • Fetal size inconsistent with dates   • Fetal hydronephrosis in pregnancy, antepartum condition   • Suspected fetal anomaly, antepartum   • Carrier of genetic disorder   • History of asthma   • Irregular menstrual cycle   • Seasonal allergic rhinitis   • Maternal care for poor fetal growth in third trimester   • 33 weeks gestation of pregnancy   • 38 weeks gestation of pregnancy   • 39 weeks gestation of pregnancy   • Fetal tachycardia affecting management of mother   • S/P  section   • Dysuria   • Postoperative state        Documented Vitals    21 1517   BP: 116/70    Weight: 113 kg (249 lb 9.6 oz)   PainSc:   6        Body mass index is 48.75 kg/m².    Physical Exam  Constitutional: Appears to be in no acute distress; Eyes: sclera normal; Endocrine system: thyroid palpate is normal; Pulmonary system: lungs clear; Cardiovascular system: heart regular rate and rhythm; Gastrointestinal system: abdomen soft nontender, active bowel sounds; Urologic system: CVA negative; Psychiatric: appropriate insight; Neurologic: gait within normal limits incision appears to be healing well    Laboratory Data:   Lab Results - Last 18 Months   Lab Units 12/20/19 2017   GLUCOSE mg/dL 83   BUN mg/dL 10   CREATININE mg/dL 0.60   SODIUM mmol/L 136   POTASSIUM mmol/L 3.8   CHLORIDE mmol/L 101   CO2 mmol/L 25.0   CALCIUM mg/dL 9.4   TOTAL PROTEIN g/dL 6.8   ALBUMIN g/dL 4.00   ALT (SGPT) U/L 11   AST (SGOT) U/L 9   ALK PHOS U/L 48   BILIRUBIN mg/dL 0.3   EGFR IF NONAFRICN AM mL/min/1.73 126   GLOBULIN gm/dL 2.8   A/G RATIO g/dL 1.4   BUN / CREAT RATIO  16.7   ANION GAP mmol/L 10.0     Lab Results - Last 18 Months   Lab Units 01/23/21  0323 01/21/21  0622 11/09/20  1107 06/17/20  1416 12/20/19 2017   WBC 10*3/mm3 19.84* 13.18* 13.40* 8.50 8.78   RBC 10*6/mm3 3.29* 4.14 4.14 4.68 4.53   HEMOGLOBIN g/dL 10.0* 12.7 12.6 13.6 13.0   HEMATOCRIT % 28.7* 35.1 36.3 40.3 37.2   MCV fL 87.2 84.8 87.7 86.1 82.1   MCH pg 30.4 30.7 30.4 29.1 28.7   MCHC g/dL 34.8 36.2* 34.7 33.7 34.9   RDW % 12.4 12.4 12.7 14.1 12.3   RDW-SD fl 39.3 37.5 39.7 44.1 37.2   MPV fL 9.3 8.9 9.8 9.8 9.8   PLATELETS 10*3/mm3 241 300 299 276 269     No results for input(s): HCGQUAL in the last 62379 hours.    Assessment        Diagnosis Plan   1. Postoperative state     2. Dysuria   empiric Bactrim get urine analysis reflex culture         Plan         New Medications Ordered This Visit   Medications   • sulfamethoxazole-trimethoprim (Bactrim DS) 800-160 MG per tablet     Sig: Take 1 tablet by mouth 2 (Two) Times a Day.     Dispense:  28  tablet     Refill:  0             This document has been electronically signed by Taz Francois MD on January 27, 2021 17:03 CST    Please note that portions of this note were completed with a voice recognition program.

## 2021-01-28 LAB
LAB AP CASE REPORT: NORMAL
PATH REPORT.FINAL DX SPEC: NORMAL

## 2021-02-01 ENCOUNTER — POSTPARTUM VISIT (OUTPATIENT)
Dept: OBSTETRICS AND GYNECOLOGY | Facility: CLINIC | Age: 21
End: 2021-02-01

## 2021-02-01 VITALS
SYSTOLIC BLOOD PRESSURE: 110 MMHG | HEIGHT: 60 IN | WEIGHT: 258.6 LBS | BODY MASS INDEX: 50.77 KG/M2 | DIASTOLIC BLOOD PRESSURE: 80 MMHG

## 2021-02-01 DIAGNOSIS — Z98.890 POSTOPERATIVE STATE: Primary | ICD-10-CM

## 2021-02-01 PROCEDURE — 0503F POSTPARTUM CARE VISIT: CPT | Performed by: OBSTETRICS & GYNECOLOGY

## 2021-02-04 ENCOUNTER — TELEPHONE (OUTPATIENT)
Dept: LACTATION | Facility: HOSPITAL | Age: 21
End: 2021-02-04

## 2021-02-04 NOTE — TELEPHONE ENCOUNTER
Lactation follow up call made. Mother states breastfeeding is going well. Infant does not latch she pumps and bottle feeds. Mother wishes to continue to do this and does not want to have a follow up appointment to work on latching.  No further questions or concerns.

## 2021-02-07 NOTE — PROGRESS NOTES
Ruth Mancini is a 20 y.o. y/o female.     Chief Complaint: Post op  follow-up    HPI:   20 y.o. .  Patient's last menstrual period was 2020 (approximate)..  Patient is doing well no complaints normal bowel and bladder function          Review of Systems     Constitutional: Denies night sweats    HENT: No hearing changes, denies ear pain    Eye: No eye pain; no foreign body in eye    Pulmonary: No hemoptysis    Cardiovascular: No claudication    GI: No hematemesis    Musculoskeletal: No arthralgias, no joint swelling    Endocrine: No polydipsia or polyuria    Hematologic: Denies any free bleeding    Psychiatric: Denies any delusions    The following portions of the patient's history were reviewed and updated as appropriate: allergies, current medications, past family history, past medical history, past social history, past surgical history and problem list.    Allergies   Allergen Reactions   • Naprosyn [Naproxen] Palpitations     twitching        Outpatient Medications Prior to Visit   Medication Sig Dispense Refill   • ibuprofen (ADVIL,MOTRIN) 800 MG tablet Take 1 tablet by mouth Every 8 (Eight) Hours. 90 tablet 0   • Prenatal Vit-Fe Fumarate-FA (prenatal vitamin 28-0.8) 28-0.8 MG tablet tablet Take 1 tablet by mouth Daily.     • sulfamethoxazole-trimethoprim (Bactrim DS) 800-160 MG per tablet Take 1 tablet by mouth 2 (Two) Times a Day. 28 tablet 0   • sennosides-docusate (Senokot S) 8.6-50 MG per tablet Take 1 tablet by mouth Daily As Needed for Constipation. 30 tablet 1     No facility-administered medications prior to visit.         The patient has a family history of   Family History   Problem Relation Age of Onset   • Ovarian cysts Mother    • Anemia Mother    • Diabetes Mother    • Other Brother         arthrogryposis   • Alzheimer's disease Other    • Asthma Other    • ADD / ADHD Other    • Bipolar disorder Other    • Breast cancer Other    • Depression Other    • Diabetes Other     • Hyperlipidemia Other    • Hypertension Other    • Lung cancer Other    • Mental illness Other    • Ovarian cancer Other    • Rheum arthritis Other    • Stroke Other    • Other Other         toxemia of pregnancy   • Psoriasis Other    • Samayoa-Kendall syndrome Other    • Diabetes Father    • Hypertension Father    • No Known Problems Paternal Grandfather    • Heart disease Paternal Grandmother    • Hypertension Paternal Grandmother    • Hypertension Maternal Grandmother    • Diabetes Maternal Grandmother    • Hyperthyroidism Maternal Grandmother    • Heart attack Maternal Grandmother    • Samayoa-Kendall syndrome Maternal Grandmother         Past Medical History:   Diagnosis Date   • Allergic asthma     IgE mediated   • Allergic conjunctivitis    • Allergic rhinitis    • Allergic rhinitis due to pollen    • Astigmatism    • Chlamydia    • Common cold    • Conjunctivitis    • Cough    • Depression    • Diarrhea    • Disorder of skin    • Dysfunction of eustachian tube    • Dysmenorrhea    • Exanthematous disorder    • Food poisoning    • Headache    • Herpes    • Hyperglycemia     mom says she is borderline diabetic   • Ingrowing nail    • Insect bite     nonvenomous   • Intrinsic asthma without status asthmaticus    • Irregular periods    • Knee pain     patellofemoral syndrome   • Migraine    • Nausea and vomiting    • Need for immunization against influenza    • Otalgia, right ear    • Pain in right arm     bruising right forearm   • Pain in throat    • Pain in toe    • Pharyngitis    • Rhinorrhea     posterior   • Rib pain     actually intercostal spasm   • Smoker    • Substance abuse (CMS/HCC)    • Tick bite     history of tick in right ear with abrasion of the ear canal   • Trichomonal vaginitis in pregnancy in first trimester 2020   • Upper respiratory infection    • Wheezing         OB History        2    Para   1    Term   1            AB   1    Living   1       SAB   1    TAB         Ectopic        Molar        Multiple   0    Live Births   1                 Social History     Socioeconomic History   • Marital status: Single     Spouse name: Not on file   • Number of children: Not on file   • Years of education: Not on file   • Highest education level: Not on file   Tobacco Use   • Smoking status: Current Every Day Smoker     Packs/day: 0.25     Types: Cigarettes   • Smokeless tobacco: Never Used   Substance and Sexual Activity   • Alcohol use: Not Currently   • Drug use: Yes     Types: Marijuana   • Sexual activity: Yes     Partners: Male     Comment: last pap smear 19 negative        Past Surgical History:   Procedure Laterality Date   •  SECTION N/A 2021    Procedure:  SECTION PRIMARY;  Surgeon: Taz Francois MD;  Location: [facility] LABOR DELIVERY;  Service: Obstetrics/Gynecology;  Laterality: N/A;   • NO PAST SURGERIES     • OTHER SURGICAL HISTORY  2015    avulsion of nail plate        Patient Active Problem List   Diagnosis   • High risk multigravida in third trimester   • Maternal morbid obesity in third trimester, antepartum (CMS/HCC)   • Nausea and vomiting during pregnancy prior to 22 weeks gestation   • Tetrahydrocannabinol (THC) use disorder, mild, abuse   • Maternal tobacco use in third trimester   • Poor fetal growth affecting management of mother in first trimester   • Fetal size inconsistent with dates   • Fetal hydronephrosis in pregnancy, antepartum condition   • Suspected fetal anomaly, antepartum   • Carrier of genetic disorder   • History of asthma   • Irregular menstrual cycle   • Seasonal allergic rhinitis   • Maternal care for poor fetal growth in third trimester   • 33 weeks gestation of pregnancy   • 38 weeks gestation of pregnancy   • 39 weeks gestation of pregnancy   • Fetal tachycardia affecting management of mother   • S/P  section   • Dysuria   • Postoperative state        Documented Vitals    21 1450   BP: 110/80  "  Weight: 117 kg (258 lb 9.6 oz)   Height: 152.4 cm (60\")   PainSc: 0-No pain        Body mass index is 50.5 kg/m².    Physical Exam  Constitutional: Appears to be in no acute distress; Eyes: sclera normal; Endocrine system: thyroid palpate is normal; Pulmonary system: lungs clear; Cardiovascular system: heart regular rate and rhythm; Gastrointestinal system: abdomen soft nontender, active bowel sounds; Urologic system: CVA negative; Psychiatric: appropriate insight; Neurologic: gait within normal limits incision healing well    Laboratory Data:   Lab Results - Last 18 Months   Lab Units 12/20/19 2017   GLUCOSE mg/dL 83   BUN mg/dL 10   CREATININE mg/dL 0.60   SODIUM mmol/L 136   POTASSIUM mmol/L 3.8   CHLORIDE mmol/L 101   CO2 mmol/L 25.0   CALCIUM mg/dL 9.4   TOTAL PROTEIN g/dL 6.8   ALBUMIN g/dL 4.00   ALT (SGPT) U/L 11   AST (SGOT) U/L 9   ALK PHOS U/L 48   BILIRUBIN mg/dL 0.3   EGFR IF NONAFRICN AM mL/min/1.73 126   GLOBULIN gm/dL 2.8   A/G RATIO g/dL 1.4   BUN / CREAT RATIO  16.7   ANION GAP mmol/L 10.0     Lab Results - Last 18 Months   Lab Units 01/23/21  0323 01/21/21  0622 11/09/20  1107 06/17/20  1416 12/20/19 2017   WBC 10*3/mm3 19.84* 13.18* 13.40* 8.50 8.78   RBC 10*6/mm3 3.29* 4.14 4.14 4.68 4.53   HEMOGLOBIN g/dL 10.0* 12.7 12.6 13.6 13.0   HEMATOCRIT % 28.7* 35.1 36.3 40.3 37.2   MCV fL 87.2 84.8 87.7 86.1 82.1   MCH pg 30.4 30.7 30.4 29.1 28.7   MCHC g/dL 34.8 36.2* 34.7 33.7 34.9   RDW % 12.4 12.4 12.7 14.1 12.3   RDW-SD fl 39.3 37.5 39.7 44.1 37.2   MPV fL 9.3 8.9 9.8 9.8 9.8   PLATELETS 10*3/mm3 241 300 299 276 269     No results for input(s): HCGQUAL in the last 46487 hours.    Assessment        Diagnosis Plan   1. Postoperative state           Plan       No orders of the defined types were placed in this encounter.            This document has been electronically signed by Taz Francois MD on February 6, 2021 18:17 CST    Please note that portions of this note were completed with a voice " recognition program.

## 2021-02-10 ENCOUNTER — POSTPARTUM VISIT (OUTPATIENT)
Dept: OBSTETRICS AND GYNECOLOGY | Facility: CLINIC | Age: 21
End: 2021-02-10

## 2021-02-10 VITALS
WEIGHT: 261.2 LBS | DIASTOLIC BLOOD PRESSURE: 78 MMHG | BODY MASS INDEX: 51.28 KG/M2 | SYSTOLIC BLOOD PRESSURE: 110 MMHG | HEIGHT: 60 IN

## 2021-02-10 DIAGNOSIS — Z98.890 POSTOPERATIVE STATE: Primary | ICD-10-CM

## 2021-02-10 PROCEDURE — 0503F POSTPARTUM CARE VISIT: CPT | Performed by: OBSTETRICS & GYNECOLOGY

## 2021-02-11 NOTE — PROGRESS NOTES
Ruth Mancini is a 20 y.o. y/o female.     Chief Complaint: Postoperative follow-up    HPI:   20 y.o. .  Patient's last menstrual period was 2020 (approximate)..  Patient is postop doing well no complaints normal bowel and bladder function          Review of Systems     Constitutional: Denies night sweats    HENT: No hearing changes, denies ear pain    Eye: No eye pain; no foreign body in eye    Pulmonary: No hemoptysis    Cardiovascular: No claudication    GI: No hematemesis    Musculoskeletal: No arthralgias, no joint swelling    Endocrine: No polydipsia or polyuria    Hematologic: Denies any free bleeding    Psychiatric: Denies any delusions    The following portions of the patient's history were reviewed and updated as appropriate: allergies, current medications, past family history, past medical history, past social history, past surgical history and problem list.    Allergies   Allergen Reactions   • Naprosyn [Naproxen] Palpitations     twitching        Outpatient Medications Prior to Visit   Medication Sig Dispense Refill   • ibuprofen (ADVIL,MOTRIN) 800 MG tablet Take 1 tablet by mouth Every 8 (Eight) Hours. 90 tablet 0   • Prenatal Vit-Fe Fumarate-FA (prenatal vitamin 28-0.8) 28-0.8 MG tablet tablet Take 1 tablet by mouth Daily.     • sennosides-docusate (Senokot S) 8.6-50 MG per tablet Take 1 tablet by mouth Daily As Needed for Constipation. 30 tablet 1   • sulfamethoxazole-trimethoprim (Bactrim DS) 800-160 MG per tablet Take 1 tablet by mouth 2 (Two) Times a Day. 28 tablet 0     No facility-administered medications prior to visit.         The patient has a family history of   Family History   Problem Relation Age of Onset   • Ovarian cysts Mother    • Anemia Mother    • Diabetes Mother    • Other Brother         arthrogryposis   • Alzheimer's disease Other    • Asthma Other    • ADD / ADHD Other    • Bipolar disorder Other    • Breast cancer Other    • Depression Other    • Diabetes  Other    • Hyperlipidemia Other    • Hypertension Other    • Lung cancer Other    • Mental illness Other    • Ovarian cancer Other    • Rheum arthritis Other    • Stroke Other    • Other Other         toxemia of pregnancy   • Psoriasis Other    • Samayoa-Kendall syndrome Other    • Diabetes Father    • Hypertension Father    • No Known Problems Paternal Grandfather    • Heart disease Paternal Grandmother    • Hypertension Paternal Grandmother    • Hypertension Maternal Grandmother    • Diabetes Maternal Grandmother    • Hyperthyroidism Maternal Grandmother    • Heart attack Maternal Grandmother    • Samayoa-Kendall syndrome Maternal Grandmother         Past Medical History:   Diagnosis Date   • Allergic asthma     IgE mediated   • Allergic conjunctivitis    • Allergic rhinitis    • Allergic rhinitis due to pollen    • Astigmatism    • Chlamydia    • Common cold    • Conjunctivitis    • Cough    • Depression    • Diarrhea    • Disorder of skin    • Dysfunction of eustachian tube    • Dysmenorrhea    • Exanthematous disorder    • Food poisoning    • Headache    • Herpes    • Hyperglycemia     mom says she is borderline diabetic   • Ingrowing nail    • Insect bite     nonvenomous   • Intrinsic asthma without status asthmaticus    • Irregular periods    • Knee pain     patellofemoral syndrome   • Migraine    • Nausea and vomiting    • Need for immunization against influenza    • Otalgia, right ear    • Pain in right arm     bruising right forearm   • Pain in throat    • Pain in toe    • Pharyngitis    • Rhinorrhea     posterior   • Rib pain     actually intercostal spasm   • Smoker    • Substance abuse (CMS/HCC)    • Tick bite     history of tick in right ear with abrasion of the ear canal   • Trichomonal vaginitis in pregnancy in first trimester 2020   • Upper respiratory infection    • Wheezing         OB History        2    Para   1    Term   1            AB   1    Living   1       SAB   1    TAB         Ectopic        Molar        Multiple   0    Live Births   1                 Social History     Socioeconomic History   • Marital status: Single     Spouse name: Not on file   • Number of children: Not on file   • Years of education: Not on file   • Highest education level: Not on file   Tobacco Use   • Smoking status: Current Some Day Smoker     Packs/day: 0.25     Types: Cigarettes   • Smokeless tobacco: Never Used   Substance and Sexual Activity   • Alcohol use: Not Currently   • Drug use: Yes     Types: Marijuana   • Sexual activity: Yes     Partners: Male     Comment: last pap smear 19 negative        Past Surgical History:   Procedure Laterality Date   •  SECTION N/A 2021    Procedure:  SECTION PRIMARY;  Surgeon: Taz Francois MD;  Location: [facility] LABOR DELIVERY;  Service: Obstetrics/Gynecology;  Laterality: N/A;   • NO PAST SURGERIES     • OTHER SURGICAL HISTORY  2015    avulsion of nail plate        Patient Active Problem List   Diagnosis   • High risk multigravida in third trimester   • Maternal morbid obesity in third trimester, antepartum (CMS/HCC)   • Nausea and vomiting during pregnancy prior to 22 weeks gestation   • Tetrahydrocannabinol (THC) use disorder, mild, abuse   • Maternal tobacco use in third trimester   • Poor fetal growth affecting management of mother in first trimester   • Fetal size inconsistent with dates   • Fetal hydronephrosis in pregnancy, antepartum condition   • Suspected fetal anomaly, antepartum   • Carrier of genetic disorder   • History of asthma   • Irregular menstrual cycle   • Seasonal allergic rhinitis   • Maternal care for poor fetal growth in third trimester   • 33 weeks gestation of pregnancy   • 38 weeks gestation of pregnancy   • 39 weeks gestation of pregnancy   • Fetal tachycardia affecting management of mother   • S/P  section   • Dysuria   • Postoperative state        Documented Vitals    02/10/21 1020   BP: 110/78  "  Weight: 118 kg (261 lb 3.2 oz)   Height: 152.4 cm (60\")   PainSc: 0-No pain        Body mass index is 51.01 kg/m².    Physical Exam  Constitutional: Appears to be in no acute distress; Eyes: sclera normal; Endocrine system: thyroid palpate is normal; Pulmonary system: lungs clear; Cardiovascular system: heart regular rate and rhythm; Gastrointestinal system: abdomen soft nontender, active bowel sounds; Urologic system: CVA negative; Psychiatric: appropriate insight; Neurologic: gait within normal limits incision appears to be healing well instructed on wound care    Laboratory Data:   Lab Results - Last 18 Months   Lab Units 12/20/19 2017   GLUCOSE mg/dL 83   BUN mg/dL 10   CREATININE mg/dL 0.60   SODIUM mmol/L 136   POTASSIUM mmol/L 3.8   CHLORIDE mmol/L 101   CO2 mmol/L 25.0   CALCIUM mg/dL 9.4   TOTAL PROTEIN g/dL 6.8   ALBUMIN g/dL 4.00   ALT (SGPT) U/L 11   AST (SGOT) U/L 9   ALK PHOS U/L 48   BILIRUBIN mg/dL 0.3   EGFR IF NONAFRICN AM mL/min/1.73 126   GLOBULIN gm/dL 2.8   A/G RATIO g/dL 1.4   BUN / CREAT RATIO  16.7   ANION GAP mmol/L 10.0     Lab Results - Last 18 Months   Lab Units 01/23/21  0323 01/21/21  0622 11/09/20  1107 06/17/20  1416 12/20/19 2017   WBC 10*3/mm3 19.84* 13.18* 13.40* 8.50 8.78   RBC 10*6/mm3 3.29* 4.14 4.14 4.68 4.53   HEMOGLOBIN g/dL 10.0* 12.7 12.6 13.6 13.0   HEMATOCRIT % 28.7* 35.1 36.3 40.3 37.2   MCV fL 87.2 84.8 87.7 86.1 82.1   MCH pg 30.4 30.7 30.4 29.1 28.7   MCHC g/dL 34.8 36.2* 34.7 33.7 34.9   RDW % 12.4 12.4 12.7 14.1 12.3   RDW-SD fl 39.3 37.5 39.7 44.1 37.2   MPV fL 9.3 8.9 9.8 9.8 9.8   PLATELETS 10*3/mm3 241 300 299 276 269     No results for input(s): HCGQUAL in the last 79399 hours.    Assessment        Diagnosis Plan   1. Postoperative state           Plan       No orders of the defined types were placed in this encounter.            This document has been electronically signed by Taz Francois MD on February 10, 2021 20:05 CST    Please note that portions " of this note were completed with a voice recognition program.

## 2021-03-10 ENCOUNTER — POSTPARTUM VISIT (OUTPATIENT)
Dept: OBSTETRICS AND GYNECOLOGY | Facility: CLINIC | Age: 21
End: 2021-03-10

## 2021-03-10 VITALS
BODY MASS INDEX: 53.01 KG/M2 | SYSTOLIC BLOOD PRESSURE: 128 MMHG | HEIGHT: 60 IN | DIASTOLIC BLOOD PRESSURE: 74 MMHG | WEIGHT: 270 LBS

## 2021-03-10 PROCEDURE — 0503F POSTPARTUM CARE VISIT: CPT | Performed by: OBSTETRICS & GYNECOLOGY

## 2021-03-10 RX ORDER — CLOTRIMAZOLE 1 %
CREAM (GRAM) TOPICAL 2 TIMES DAILY
Qty: 60 G | Refills: 2 | OUTPATIENT
Start: 2021-03-10 | End: 2022-01-05

## 2021-03-13 NOTE — PROGRESS NOTES
Ruth Mancini is a 21 y.o. y/o female.     Chief Complaint: Postop follow-up    HPI:   21 y.o. .  Patient's last menstrual period was 2020 (approximate)..  Patient postop  section doing well no complaints [place] score 0          Review of Systems     Constitutional: Denies night sweats    HENT: No hearing changes, denies ear pain    Eye: No eye pain; no foreign body in eye    Pulmonary: No hemoptysis    Cardiovascular: No claudication    GI: No hematemesis    Musculoskeletal: No arthralgias, no joint swelling    Endocrine: No polydipsia or polyuria    Hematologic: Denies any free bleeding    Psychiatric: Denies any delusions    The following portions of the patient's history were reviewed and updated as appropriate: allergies, current medications, past family history, past medical history, past social history, past surgical history and problem list.    Allergies   Allergen Reactions   • Naprosyn [Naproxen] Palpitations     twitching        Outpatient Medications Prior to Visit   Medication Sig Dispense Refill   • Prenatal Vit-Fe Fumarate-FA (prenatal vitamin 28-0.8) 28-0.8 MG tablet tablet Take 1 tablet by mouth Daily.     • ibuprofen (ADVIL,MOTRIN) 800 MG tablet Take 1 tablet by mouth Every 8 (Eight) Hours. 90 tablet 0   • sennosides-docusate (Senokot S) 8.6-50 MG per tablet Take 1 tablet by mouth Daily As Needed for Constipation. 30 tablet 1   • sulfamethoxazole-trimethoprim (Bactrim DS) 800-160 MG per tablet Take 1 tablet by mouth 2 (Two) Times a Day. 28 tablet 0     No facility-administered medications prior to visit.        The patient has a family history of   Family History   Problem Relation Age of Onset   • Ovarian cysts Mother    • Anemia Mother    • Diabetes Mother    • Other Brother         arthrogryposis   • Alzheimer's disease Other    • Asthma Other    • ADD / ADHD Other    • Bipolar disorder Other    • Breast cancer Other    • Depression Other    • Diabetes Other    •  Hyperlipidemia Other    • Hypertension Other    • Lung cancer Other    • Mental illness Other    • Ovarian cancer Other    • Rheum arthritis Other    • Stroke Other    • Other Other         toxemia of pregnancy   • Psoriasis Other    • Samayoa-Kendall syndrome Other    • Diabetes Father    • Hypertension Father    • No Known Problems Paternal Grandfather    • Heart disease Paternal Grandmother    • Hypertension Paternal Grandmother    • Hypertension Maternal Grandmother    • Diabetes Maternal Grandmother    • Hyperthyroidism Maternal Grandmother    • Heart attack Maternal Grandmother    • Samayoa-Kendall syndrome Maternal Grandmother         Past Medical History:   Diagnosis Date   • Allergic asthma     IgE mediated   • Allergic conjunctivitis    • Allergic rhinitis    • Allergic rhinitis due to pollen    • Astigmatism    • Chlamydia    • Common cold    • Conjunctivitis    • Cough    • Depression    • Diarrhea    • Disorder of skin    • Dysfunction of eustachian tube    • Dysmenorrhea    • Exanthematous disorder    • Food poisoning    • Headache    • Herpes    • Hyperglycemia     mom says she is borderline diabetic   • Ingrowing nail    • Insect bite     nonvenomous   • Intrinsic asthma without status asthmaticus    • Irregular periods    • Knee pain     patellofemoral syndrome   • Migraine    • Nausea and vomiting    • Need for immunization against influenza    • Otalgia, right ear    • Pain in right arm     bruising right forearm   • Pain in throat    • Pain in toe    • Pharyngitis    • Rhinorrhea     posterior   • Rib pain     actually intercostal spasm   • Smoker    • Substance abuse (CMS/HCC)    • Tick bite     history of tick in right ear with abrasion of the ear canal   • Trichomonal vaginitis in pregnancy in first trimester 2020   • Upper respiratory infection    • Wheezing         OB History        2    Para   1    Term   1            AB   1    Living   1       SAB   1    TAB         Ectopic        Molar        Multiple   0    Live Births   1                 Social History     Socioeconomic History   • Marital status: Single     Spouse name: Not on file   • Number of children: Not on file   • Years of education: Not on file   • Highest education level: Not on file   Tobacco Use   • Smoking status: Current Some Day Smoker     Packs/day: 0.25     Types: Cigarettes   • Smokeless tobacco: Never Used   Substance and Sexual Activity   • Alcohol use: Not Currently   • Drug use: Yes     Types: Marijuana   • Sexual activity: Yes     Partners: Male     Comment: last pap smear 19 negative        Past Surgical History:   Procedure Laterality Date   •  SECTION N/A 2021    Procedure:  SECTION PRIMARY;  Surgeon: Taz Francois MD;  Location: [facility] LABOR DELIVERY;  Service: Obstetrics/Gynecology;  Laterality: N/A;   • NO PAST SURGERIES     • OTHER SURGICAL HISTORY  2015    avulsion of nail plate        Patient Active Problem List   Diagnosis   • High risk multigravida in third trimester   • Maternal morbid obesity in third trimester, antepartum (CMS/HCC)   • Nausea and vomiting during pregnancy prior to 22 weeks gestation   • Tetrahydrocannabinol (THC) use disorder, mild, abuse   • Maternal tobacco use in third trimester   • Poor fetal growth affecting management of mother in first trimester   • Fetal size inconsistent with dates   • Fetal hydronephrosis in pregnancy, antepartum condition   • Suspected fetal anomaly, antepartum   • Carrier of genetic disorder   • History of asthma   • Irregular menstrual cycle   • Seasonal allergic rhinitis   • Maternal care for poor fetal growth in third trimester   • 33 weeks gestation of pregnancy   • 38 weeks gestation of pregnancy   • 39 weeks gestation of pregnancy   • Fetal tachycardia affecting management of mother   • S/P  section   • Dysuria   • Postoperative state        Documented Vitals    03/10/21 1507   BP: 128/74  "  Weight: 122 kg (270 lb)   Height: 152.4 cm (60\")        Body mass index is 52.73 kg/m².    Physical Exam  Constitutional: Appears to be in no acute distress; Eyes: sclera normal; Endocrine system: thyroid palpate is normal; Pulmonary system: lungs clear; Cardiovascular system: heart regular rate and rhythm; Gastrointestinal system: abdomen soft nontender, active bowel sounds; Urologic system: CVA negative; Psychiatric: appropriate insight; Neurologic: gait within normal limits incision appears to be healing well    Laboratory Data:   Lab Results - Last 18 Months   Lab Units 12/20/19 2017   GLUCOSE mg/dL 83   BUN mg/dL 10   CREATININE mg/dL 0.60   SODIUM mmol/L 136   POTASSIUM mmol/L 3.8   CHLORIDE mmol/L 101   CO2 mmol/L 25.0   CALCIUM mg/dL 9.4   TOTAL PROTEIN g/dL 6.8   ALBUMIN g/dL 4.00   ALT (SGPT) U/L 11   AST (SGOT) U/L 9   ALK PHOS U/L 48   BILIRUBIN mg/dL 0.3   EGFR IF NONAFRICN AM mL/min/1.73 126   GLOBULIN gm/dL 2.8   A/G RATIO g/dL 1.4   BUN / CREAT RATIO  16.7   ANION GAP mmol/L 10.0     Lab Results - Last 18 Months   Lab Units 01/23/21  0323 01/21/21  0622 11/09/20  1107 06/17/20  1416 12/20/19 2017   WBC 10*3/mm3 19.84* 13.18* 13.40* 8.50 8.78   RBC 10*6/mm3 3.29* 4.14 4.14 4.68 4.53   HEMOGLOBIN g/dL 10.0* 12.7 12.6 13.6 13.0   HEMATOCRIT % 28.7* 35.1 36.3 40.3 37.2   MCV fL 87.2 84.8 87.7 86.1 82.1   MCH pg 30.4 30.7 30.4 29.1 28.7   MCHC g/dL 34.8 36.2* 34.7 33.7 34.9   RDW % 12.4 12.4 12.7 14.1 12.3   RDW-SD fl 39.3 37.5 39.7 44.1 37.2   MPV fL 9.3 8.9 9.8 9.8 9.8   PLATELETS 10*3/mm3 241 300 299 276 269     No results for input(s): HCGQUAL in the last 84237 hours.    Assessment       No diagnosis found.      Plan         New Medications Ordered This Visit   Medications   • clotrimazole (LOTRIMIN) 1 % cream     Sig: Apply  topically to the appropriate area as directed 2 (Two) Times a Day.     Dispense:  60 g     Refill:  2             This document has been electronically signed by Taz HANKS " MD Priscila on March 13, 2021 16:15 CST    Please note that portions of this note were completed with a voice recognition program.

## 2021-03-18 ENCOUNTER — POSTPARTUM VISIT (OUTPATIENT)
Dept: OBSTETRICS AND GYNECOLOGY | Facility: CLINIC | Age: 21
End: 2021-03-18

## 2021-03-18 VITALS
HEIGHT: 60 IN | WEIGHT: 279.2 LBS | DIASTOLIC BLOOD PRESSURE: 72 MMHG | BODY MASS INDEX: 54.81 KG/M2 | SYSTOLIC BLOOD PRESSURE: 130 MMHG

## 2021-03-18 DIAGNOSIS — Z98.890 POSTOPERATIVE STATE: Primary | ICD-10-CM

## 2021-03-19 NOTE — PROGRESS NOTES
Ruth Mancini is a 21 y.o. y/o female.     Chief Complaint: Postop follow-up    HPI:   21 y.o. .  Patient's last menstrual period was 2020 (approximate)..  Patient's postop  section doing well no complaints          Review of Systems     Constitutional: Denies night sweats    HENT: No hearing changes, denies ear pain    Eye: No eye pain; no foreign body in eye    Pulmonary: No hemoptysis    Cardiovascular: No claudication    GI: No hematemesis    Musculoskeletal: No arthralgias, no joint swelling    Endocrine: No polydipsia or polyuria    Hematologic: Denies any free bleeding    Psychiatric: Denies any delusions    The following portions of the patient's history were reviewed and updated as appropriate: allergies, current medications, past family history, past medical history, past social history, past surgical history and problem list.    Allergies   Allergen Reactions   • Naprosyn [Naproxen] Palpitations     twitching        Outpatient Medications Prior to Visit   Medication Sig Dispense Refill   • clotrimazole (LOTRIMIN) 1 % cream Apply  topically to the appropriate area as directed 2 (Two) Times a Day. 60 g 2   • Prenatal Vit-Fe Fumarate-FA (prenatal vitamin 28-0.8) 28-0.8 MG tablet tablet Take 1 tablet by mouth Daily.       No facility-administered medications prior to visit.        The patient has a family history of   Family History   Problem Relation Age of Onset   • Ovarian cysts Mother    • Anemia Mother    • Diabetes Mother    • Other Brother         arthrogryposis   • Alzheimer's disease Other    • Asthma Other    • ADD / ADHD Other    • Bipolar disorder Other    • Breast cancer Other    • Depression Other    • Diabetes Other    • Hyperlipidemia Other    • Hypertension Other    • Lung cancer Other    • Mental illness Other    • Ovarian cancer Other    • Rheum arthritis Other    • Stroke Other    • Other Other         toxemia of pregnancy   • Psoriasis Other    • Samayoa-Kendall  syndrome Other    • Diabetes Father    • Hypertension Father    • No Known Problems Paternal Grandfather    • Heart disease Paternal Grandmother    • Hypertension Paternal Grandmother    • Hypertension Maternal Grandmother    • Diabetes Maternal Grandmother    • Hyperthyroidism Maternal Grandmother    • Heart attack Maternal Grandmother    • Samayoa-Kendall syndrome Maternal Grandmother         Past Medical History:   Diagnosis Date   • Allergic asthma     IgE mediated   • Allergic conjunctivitis    • Allergic rhinitis    • Allergic rhinitis due to pollen    • Astigmatism    • Chlamydia    • Common cold    • Conjunctivitis    • Cough    • Depression    • Diarrhea    • Disorder of skin    • Dysfunction of eustachian tube    • Dysmenorrhea    • Exanthematous disorder    • Food poisoning    • Headache    • Herpes    • Hyperglycemia     mom says she is borderline diabetic   • Ingrowing nail    • Insect bite     nonvenomous   • Intrinsic asthma without status asthmaticus    • Irregular periods    • Knee pain     patellofemoral syndrome   • Migraine    • Nausea and vomiting    • Need for immunization against influenza    • Otalgia, right ear    • Pain in right arm     bruising right forearm   • Pain in throat    • Pain in toe    • Pharyngitis    • Rhinorrhea     posterior   • Rib pain     actually intercostal spasm   • Smoker    • Substance abuse (CMS/HCC)    • Tick bite     history of tick in right ear with abrasion of the ear canal   • Trichomonal vaginitis in pregnancy in first trimester 2020   • Upper respiratory infection    • Wheezing         OB History        2    Para   1    Term   1            AB   1    Living   1       SAB   1    TAB        Ectopic        Molar        Multiple   0    Live Births   1                 Social History     Socioeconomic History   • Marital status: Single     Spouse name: Not on file   • Number of children: Not on file   • Years of education: Not on file   • Highest  "education level: Not on file   Tobacco Use   • Smoking status: Current Some Day Smoker     Packs/day: 0.25     Types: Cigarettes   • Smokeless tobacco: Never Used   Substance and Sexual Activity   • Alcohol use: Not Currently   • Drug use: Yes     Types: Marijuana   • Sexual activity: Yes     Partners: Male     Comment: last pap smear 19 negative        Past Surgical History:   Procedure Laterality Date   •  SECTION N/A 2021    Procedure:  SECTION PRIMARY;  Surgeon: Taz Francois MD;  Location: [facility] LABOR DELIVERY;  Service: Obstetrics/Gynecology;  Laterality: N/A;   • NO PAST SURGERIES     • OTHER SURGICAL HISTORY  2015    avulsion of nail plate        Patient Active Problem List   Diagnosis   • High risk multigravida in third trimester   • Maternal morbid obesity in third trimester, antepartum (CMS/HCC)   • Nausea and vomiting during pregnancy prior to 22 weeks gestation   • Tetrahydrocannabinol (THC) use disorder, mild, abuse   • Maternal tobacco use in third trimester   • Poor fetal growth affecting management of mother in first trimester   • Fetal size inconsistent with dates   • Fetal hydronephrosis in pregnancy, antepartum condition   • Suspected fetal anomaly, antepartum   • Carrier of genetic disorder   • History of asthma   • Irregular menstrual cycle   • Seasonal allergic rhinitis   • Maternal care for poor fetal growth in third trimester   • 33 weeks gestation of pregnancy   • 38 weeks gestation of pregnancy   • 39 weeks gestation of pregnancy   • Fetal tachycardia affecting management of mother   • S/P  section   • Dysuria   • Postoperative state        Documented Vitals    21 1422   BP: 130/72   Weight: 127 kg (279 lb 3.2 oz)   Height: 152.4 cm (60\")   PainSc: 0-No pain        Body mass index is 54.53 kg/m².    Physical Exam  Constitutional: Appears to be in no acute distress; Eyes: Sclerae normal; Endocrine system: Thyroid palpation is normal; " Pulmonary system: Lungs clear; Cardiovascular system: Heart regular rate and rhythm; Gastrointestinal system: Abdomen soft and nontender, active bowel sounds; Urologic system: CVA negative; Psychiatric: Appropriate insight; Neurologic: Gait within normal limits incision appears to be healing well    Laboratory Data:   Lab Results - Last 18 Months   Lab Units 12/20/19 2017   GLUCOSE mg/dL 83   BUN mg/dL 10   CREATININE mg/dL 0.60   SODIUM mmol/L 136   POTASSIUM mmol/L 3.8   CHLORIDE mmol/L 101   CO2 mmol/L 25.0   CALCIUM mg/dL 9.4   TOTAL PROTEIN g/dL 6.8   ALBUMIN g/dL 4.00   ALT (SGPT) U/L 11   AST (SGOT) U/L 9   ALK PHOS U/L 48   BILIRUBIN mg/dL 0.3   EGFR IF NONAFRICN AM mL/min/1.73 126   GLOBULIN gm/dL 2.8   A/G RATIO g/dL 1.4   BUN / CREAT RATIO  16.7   ANION GAP mmol/L 10.0     Lab Results - Last 18 Months   Lab Units 01/23/21  0323 01/21/21  0622 11/09/20  1107 06/17/20  1416 12/20/19 2017   WBC 10*3/mm3 19.84* 13.18* 13.40* 8.50 8.78   RBC 10*6/mm3 3.29* 4.14 4.14 4.68 4.53   HEMOGLOBIN g/dL 10.0* 12.7 12.6 13.6 13.0   HEMATOCRIT % 28.7* 35.1 36.3 40.3 37.2   MCV fL 87.2 84.8 87.7 86.1 82.1   MCH pg 30.4 30.7 30.4 29.1 28.7   MCHC g/dL 34.8 36.2* 34.7 33.7 34.9   RDW % 12.4 12.4 12.7 14.1 12.3   RDW-SD fl 39.3 37.5 39.7 44.1 37.2   MPV fL 9.3 8.9 9.8 9.8 9.8   PLATELETS 10*3/mm3 241 300 299 276 269     No results for input(s): HCGQUAL in the last 98162 hours.    Assessment        Diagnosis Plan   1. Postoperative state     Contraceptive counseling done wants to use foam and condoms does not need Pap smear until 2022  Plan       No orders of the defined types were placed in this encounter.            This document has been electronically signed by Taz Francois MD on March 19, 2021 10:49 CDT    Please note that portions of this note were completed with a voice recognition program.

## 2022-01-05 ENCOUNTER — HOSPITAL ENCOUNTER (EMERGENCY)
Facility: HOSPITAL | Age: 22
Discharge: HOME | End: 2022-01-05
Attending: EMERGENCY MEDICINE | Admitting: EMERGENCY MEDICINE

## 2022-01-05 VITALS
SYSTOLIC BLOOD PRESSURE: 115 MMHG | TEMPERATURE: 98.8 F | DIASTOLIC BLOOD PRESSURE: 70 MMHG | RESPIRATION RATE: 20 BRPM | WEIGHT: 293 LBS | BODY MASS INDEX: 57.52 KG/M2 | HEART RATE: 99 BPM | OXYGEN SATURATION: 95 % | HEIGHT: 60 IN

## 2022-01-05 DIAGNOSIS — U07.1 COVID: Primary | ICD-10-CM

## 2022-01-05 LAB
FLUAV RNA RESP QL NAA+PROBE: NOT DETECTED
FLUBV RNA RESP QL NAA+PROBE: NOT DETECTED
SARS-COV-2 RNA RESP QL NAA+PROBE: DETECTED

## 2022-01-05 PROCEDURE — 99283 EMERGENCY DEPT VISIT LOW MDM: CPT

## 2022-01-05 PROCEDURE — 63710000001 ONDANSETRON ODT 4 MG TABLET DISPERSIBLE: Performed by: EMERGENCY MEDICINE

## 2022-01-05 PROCEDURE — C9803 HOPD COVID-19 SPEC COLLECT: HCPCS

## 2022-01-05 PROCEDURE — 87636 SARSCOV2 & INF A&B AMP PRB: CPT | Performed by: EMERGENCY MEDICINE

## 2022-01-05 RX ORDER — ONDANSETRON 4 MG/1
4 TABLET, ORALLY DISINTEGRATING ORAL ONCE
Status: COMPLETED | OUTPATIENT
Start: 2022-01-05 | End: 2022-01-05

## 2022-01-05 RX ORDER — GUAIFENESIN 600 MG/1
1200 TABLET, EXTENDED RELEASE ORAL ONCE
Status: COMPLETED | OUTPATIENT
Start: 2022-01-05 | End: 2022-01-05

## 2022-01-05 RX ADMIN — GUAIFENESIN 1200 MG: 600 TABLET, EXTENDED RELEASE ORAL at 15:03

## 2022-01-05 RX ADMIN — ONDANSETRON 4 MG: 4 TABLET, ORALLY DISINTEGRATING ORAL at 15:03

## 2022-01-05 NOTE — ED PROVIDER NOTES
Subjective   21-year-old obese female presents the emergency department with complaint of cough, fever, and generalized body aches.  She reports symptoms began this morning.  She took Motrin this morning with some relief of her discomfort.  She denies any significant past medical history and does not take any daily medications.  Initially denies any sick contacts but then reports a family member had Covid on 23 December.    Family history, surgical history, social history, current medications and allergies are reviewed with the patient and triage documentation and vitals are reviewed.      History provided by:  Patient   used: No        Review of Systems   Constitutional: Positive for fever. Negative for chills.   HENT: Negative for congestion, sinus pressure, sinus pain and sore throat.    Eyes: Negative for photophobia and visual disturbance.   Respiratory: Positive for cough.    Cardiovascular: Negative for chest pain, palpitations and leg swelling.   Gastrointestinal: Negative for abdominal pain, diarrhea and vomiting.   Endocrine: Negative for polydipsia, polyphagia and polyuria.   Genitourinary: Negative for dysuria, frequency and urgency.   Musculoskeletal: Positive for myalgias.   Skin: Negative for rash and wound.   Allergic/Immunologic: Negative.    Neurological: Negative.    Hematological: Negative.    Psychiatric/Behavioral: Negative.        Past Medical History:   Diagnosis Date   • Allergic asthma     IgE mediated   • Allergic conjunctivitis    • Allergic rhinitis    • Allergic rhinitis due to pollen    • Astigmatism    • Chlamydia    • Common cold    • Conjunctivitis    • Cough    • Depression    • Diarrhea    • Disorder of skin    • Dysfunction of eustachian tube    • Dysmenorrhea    • Exanthematous disorder    • Food poisoning    • Headache    • Herpes    • Hyperglycemia     mom says she is borderline diabetic   • Ingrowing nail    • Insect bite     nonvenomous   • Intrinsic  asthma without status asthmaticus    • Irregular periods    • Knee pain     patellofemoral syndrome   • Migraine    • Nausea and vomiting    • Need for immunization against influenza    • Otalgia, right ear    • Pain in right arm     bruising right forearm   • Pain in throat    • Pain in toe    • Pharyngitis    • Rhinorrhea     posterior   • Rib pain     actually intercostal spasm   • Smoker    • Substance abuse (HCC)    • Tick bite     history of tick in right ear with abrasion of the ear canal   • Trichomonal vaginitis in pregnancy in first trimester 2020   • Upper respiratory infection    • Wheezing        Allergies   Allergen Reactions   • Naprosyn [Naproxen] Palpitations     twitching       Past Surgical History:   Procedure Laterality Date   •  SECTION N/A 2021    Procedure:  SECTION PRIMARY;  Surgeon: Taz Francois MD;  Location: [facility] LABOR DELIVERY;  Service: Obstetrics/Gynecology;  Laterality: N/A;   • NO PAST SURGERIES     • OTHER SURGICAL HISTORY  2015    avulsion of nail plate       Family History   Problem Relation Age of Onset   • Ovarian cysts Mother    • Anemia Mother    • Diabetes Mother    • Other Brother         arthrogryposis   • Alzheimer's disease Other    • Asthma Other    • ADD / ADHD Other    • Bipolar disorder Other    • Breast cancer Other    • Depression Other    • Diabetes Other    • Hyperlipidemia Other    • Hypertension Other    • Lung cancer Other    • Mental illness Other    • Ovarian cancer Other    • Rheum arthritis Other    • Stroke Other    • Other Other         toxemia of pregnancy   • Psoriasis Other    • Samayoa-Kendall syndrome Other    • Diabetes Father    • Hypertension Father    • No Known Problems Paternal Grandfather    • Heart disease Paternal Grandmother    • Hypertension Paternal Grandmother    • Hypertension Maternal Grandmother    • Diabetes Maternal Grandmother    • Hyperthyroidism Maternal Grandmother    • Heart attack Maternal  Grandmother    • Samayoa-Kendall syndrome Maternal Grandmother        Social History     Socioeconomic History   • Marital status: Single   Tobacco Use   • Smoking status: Current Some Day Smoker     Packs/day: 0.25     Types: Cigarettes   • Smokeless tobacco: Never Used   Substance and Sexual Activity   • Alcohol use: Not Currently   • Drug use: Yes     Types: Marijuana   • Sexual activity: Yes     Partners: Male     Comment: last pap smear 7/1/19 negative           Objective   Physical Exam  Vitals and nursing note reviewed.   Constitutional:       General: She is not in acute distress.     Appearance: Normal appearance. She is obese. She is not ill-appearing, toxic-appearing or diaphoretic.   HENT:      Head: Normocephalic.   Eyes:      Conjunctiva/sclera: Conjunctivae normal.      Pupils: Pupils are equal, round, and reactive to light.   Cardiovascular:      Rate and Rhythm: Regular rhythm. Tachycardia present.      Pulses: Normal pulses.      Heart sounds: No murmur heard.      Pulmonary:      Effort: Pulmonary effort is normal. No respiratory distress.      Breath sounds: Normal breath sounds. No wheezing or rhonchi.   Musculoskeletal:         General: Normal range of motion.      Cervical back: Normal range of motion.   Skin:     General: Skin is warm and dry.      Capillary Refill: Capillary refill takes less than 2 seconds.   Neurological:      Mental Status: She is alert and oriented to person, place, and time.   Psychiatric:         Mood and Affect: Mood normal.         Behavior: Behavior normal.         Procedures  none         ED Course      Labs Reviewed   COVID-19 AND FLU A/B, NP SWAB IN TRANSPORT MEDIA 8-12 HR TAT - Abnormal; Notable for the following components:       Result Value    COVID19 Detected (*)     All other components within normal limits    Narrative:     Fact sheet for providers: https://www.fda.gov/media/372914/download    Fact sheet for patients:  https://www.fda.gov/media/856786/download    Test performed by PCR.  Influenza A and Influenza B negative results should be considered presumptive in samples that have a positive SARS-CoV-2 result.    Competitive inhibition studies showed that SARS-CoV-2 virus, when present at concentrations above 3.6E+04 copies/mL, can inhibit the detection and amplification of influenza A and influenza B virus RNA if present at or below 1.8E+02 copies/mL or 4.9E+02 copies/mL, respectively, and may lead to false negative influenza virus results. If co-infection with influenza A or influenza B virus is suspected in samples with a positive SARS-CoV-2 result, the sample should be re-tested with another FDA cleared, approved, or authorized influenza test, if influenza virus detection would change clinical management.     No results found.          MDM  Number of Diagnoses or Management Options     Amount and/or Complexity of Data Reviewed  Clinical lab tests: reviewed    Patient Progress  Patient progress: stable    Patient found to be Covid positive.  Advised on symptomatic treatment.  Advised on reasons to return to emergency department.  Also advised that with her BMI she does qualify for monoclonal antibody transfusion but we did not have any available at this time and she should follow-up with her primary care doctor regarding possibility of transfusion.    Final diagnoses:   COVID         ED Disposition  ED Disposition     ED Disposition Condition Comment    Discharge Stable           Megan Preston, APRN  [address] [facility] [address] [phone]      As needed    [facility] EMERGENCY DEPARTMENT  [address] [place] [address] [phone]    If symptoms worsen         Medication List      Stop    clotrimazole 1 % cream  Commonly known as: LOTRIMIN             Taqueria Nelson, DO  01/05/22 1453       Taqueria Nelson, DO  01/05/22 1530

## 2022-01-13 RX ORDER — PNV NO.95/FERROUS FUM/FOLIC AC 28MG-0.8MG
1 TABLET ORAL DAILY
Qty: 30 TABLET | Refills: 6 | Status: SHIPPED | OUTPATIENT
Start: 2022-01-13 | End: 2022-07-22

## 2022-07-12 ENCOUNTER — HOSPITAL ENCOUNTER (EMERGENCY)
Facility: HOSPITAL | Age: 22
Discharge: LEFT BEFORE DISCHARGE | End: 2022-07-12

## 2022-07-12 ENCOUNTER — APPOINTMENT (OUTPATIENT)
Dept: GENERAL RADIOLOGY | Facility: HOSPITAL | Age: 22
End: 2022-07-12

## 2022-07-12 VITALS
OXYGEN SATURATION: 99 % | TEMPERATURE: 98 F | HEART RATE: 119 BPM | HEIGHT: 60 IN | RESPIRATION RATE: 20 BRPM | WEIGHT: 293 LBS | BODY MASS INDEX: 57.52 KG/M2

## 2022-07-12 PROCEDURE — 99211 OFF/OP EST MAY X REQ PHY/QHP: CPT

## 2022-07-12 PROCEDURE — 71046 X-RAY EXAM CHEST 2 VIEWS: CPT

## 2022-07-22 ENCOUNTER — HOSPITAL ENCOUNTER (EMERGENCY)
Facility: HOSPITAL | Age: 22
Discharge: HOME | End: 2022-07-22
Attending: EMERGENCY MEDICINE | Admitting: EMERGENCY MEDICINE

## 2022-07-22 ENCOUNTER — APPOINTMENT (OUTPATIENT)
Dept: GENERAL RADIOLOGY | Facility: HOSPITAL | Age: 22
End: 2022-07-22

## 2022-07-22 VITALS
RESPIRATION RATE: 18 BRPM | TEMPERATURE: 98.5 F | HEIGHT: 60 IN | WEIGHT: 293 LBS | OXYGEN SATURATION: 98 % | HEART RATE: 82 BPM | SYSTOLIC BLOOD PRESSURE: 147 MMHG | BODY MASS INDEX: 57.52 KG/M2 | DIASTOLIC BLOOD PRESSURE: 85 MMHG

## 2022-07-22 DIAGNOSIS — S22.32XA CLOSED FRACTURE OF ONE RIB OF LEFT SIDE, INITIAL ENCOUNTER: Primary | ICD-10-CM

## 2022-07-22 PROCEDURE — 99283 EMERGENCY DEPT VISIT LOW MDM: CPT

## 2022-07-22 PROCEDURE — 71046 X-RAY EXAM CHEST 2 VIEWS: CPT

## 2022-07-22 RX ORDER — IBUPROFEN 400 MG/1
400 TABLET ORAL ONCE
Status: COMPLETED | OUTPATIENT
Start: 2022-07-22 | End: 2022-07-22

## 2022-07-22 RX ORDER — HYDROCODONE BITARTRATE AND ACETAMINOPHEN 5; 325 MG/1; MG/1
1 TABLET ORAL EVERY 8 HOURS PRN
Qty: 12 TABLET | Refills: 0 | Status: SHIPPED | OUTPATIENT
Start: 2022-07-22

## 2022-07-22 RX ORDER — HYDROCODONE BITARTRATE AND ACETAMINOPHEN 10; 325 MG/1; MG/1
1 TABLET ORAL ONCE
Status: COMPLETED | OUTPATIENT
Start: 2022-07-22 | End: 2022-07-22

## 2022-07-22 RX ORDER — BENZONATATE 200 MG/1
200 CAPSULE ORAL 3 TIMES DAILY PRN
Qty: 21 CAPSULE | Refills: 0 | Status: SHIPPED | OUTPATIENT
Start: 2022-07-22

## 2022-07-22 RX ADMIN — IBUPROFEN 400 MG: 400 TABLET ORAL at 14:55

## 2022-07-22 RX ADMIN — HYDROCODONE BITARTRATE AND ACETAMINOPHEN 1 TABLET: 10; 325 TABLET ORAL at 15:03

## 2022-07-22 NOTE — ED PROVIDER NOTES
Subjective     Chest Pain  Pain location:  L chest (Lateral aspect of the chest at approximately the fifth or sixth rib level)  Pain quality: aching and stabbing    Pain radiates to:  Does not radiate  Pain severity:  Moderate  Onset quality:  Sudden  Duration: The patient had an illness with significant coughing a week ago leading to left-sided chest wall pain that was worsened today with a sensation of a pop when she was carrying one of her children.  Timing:  Constant  Progression:  Waxing and waning  Chronicity:  New  Context comment:  Felt a pop today while carrying her toddler.  This worsened the pain.  The pain is also worse with breathing.  Relieved by:  Nothing  Worsened by:  Coughing and deep breathing  Ineffective treatments: Motrin.  Associated symptoms: no abdominal pain, no anxiety, no back pain, no diaphoresis, no dysphagia, no fever, no nausea, no palpitations, no shortness of breath, no syncope and no vomiting        Review of Systems   Constitutional: Negative for diaphoresis and fever.   HENT: Negative for trouble swallowing.    Respiratory: Negative for shortness of breath.    Cardiovascular: Positive for chest pain. Negative for palpitations and syncope.   Gastrointestinal: Negative for abdominal pain, nausea and vomiting.   Musculoskeletal: Negative for back pain.   All other systems reviewed and are negative.      Past Medical History:   Diagnosis Date   • Allergic asthma     IgE mediated   • Allergic conjunctivitis    • Allergic rhinitis    • Allergic rhinitis due to pollen    • Astigmatism    • Chlamydia    • Common cold    • Conjunctivitis    • Cough    • Diarrhea    • Disorder of skin    • Dysfunction of eustachian tube    • Dysmenorrhea    • Exanthematous disorder    • Food poisoning    • Headache    • Herpes    • Hyperglycemia     mom says she is borderline diabetic   • Ingrowing nail    • Insect bite     nonvenomous   • Intrinsic asthma without status asthmaticus    • Irregular periods     • Knee pain     patellofemoral syndrome   • Migraine    • Nausea and vomiting    • Need for immunization against influenza    • Otalgia, right ear    • Pain in right arm     bruising right forearm   • Pain in throat    • Pain in toe    • Pharyngitis    • Rhinorrhea     posterior   • Rib pain     actually intercostal spasm   • Smoker    • Substance abuse (HCC)    • Tick bite     history of tick in right ear with abrasion of the ear canal   • Trichomonal vaginitis in pregnancy in first trimester 2020   • Upper respiratory infection    • Wheezing        Allergies   Allergen Reactions   • Naprosyn [Naproxen] Palpitations     twitching       Past Surgical History:   Procedure Laterality Date   •  SECTION N/A 2021    Procedure:  SECTION PRIMARY;  Surgeon: Taz Francois MD;  Location: [facility] LABOR DELIVERY;  Service: Obstetrics/Gynecology;  Laterality: N/A;   • NO PAST SURGERIES     • OTHER SURGICAL HISTORY  2015    avulsion of nail plate       Family History   Problem Relation Age of Onset   • Ovarian cysts Mother    • Anemia Mother    • Diabetes Mother    • Other Brother         arthrogryposis   • Alzheimer's disease Other    • Asthma Other    • ADD / ADHD Other    • Bipolar disorder Other    • Breast cancer Other    • Depression Other    • Diabetes Other    • Hyperlipidemia Other    • Hypertension Other    • Lung cancer Other    • Mental illness Other    • Ovarian cancer Other    • Rheum arthritis Other    • Stroke Other    • Other Other         toxemia of pregnancy   • Psoriasis Other    • Samayoa-Kendall syndrome Other    • Diabetes Father    • Hypertension Father    • No Known Problems Paternal Grandfather    • Heart disease Paternal Grandmother    • Hypertension Paternal Grandmother    • Hypertension Maternal Grandmother    • Diabetes Maternal Grandmother    • Hyperthyroidism Maternal Grandmother    • Heart attack Maternal Grandmother    • Samayoa-Kendall syndrome Maternal  Grandmother        Social History     Socioeconomic History   • Marital status: Single   Tobacco Use   • Smoking status: Current Some Day Smoker     Packs/day: 0.25     Types: Cigarettes   • Smokeless tobacco: Never Used   Substance and Sexual Activity   • Alcohol use: Not Currently   • Drug use: Not Currently     Types: Marijuana   • Sexual activity: Yes     Partners: Male     Comment: last pap smear 7/1/19 negative           Objective   Physical Exam  Vitals and nursing note reviewed.   Constitutional:       Appearance: She is not ill-appearing.   HENT:      Head: Normocephalic and atraumatic.      Right Ear: External ear normal.      Left Ear: External ear normal.      Nose: Nose normal.   Eyes:      General: No scleral icterus.  Cardiovascular:      Rate and Rhythm: Normal rate and regular rhythm.      Pulses: Normal pulses.   Pulmonary:      Effort: Pulmonary effort is normal.      Breath sounds: Normal breath sounds.      Comments: Point tenderness on the left lateral chest at approximately the sixth rib.  Reproducible both with palpation and having the patient take a deep breath.  Chest:      Chest wall: Tenderness present.   Abdominal:      General: Abdomen is flat.      Tenderness: There is no abdominal tenderness.   Musculoskeletal:         General: No signs of injury.      Comments: Painless left upper extremity range of motion.   Skin:     General: Skin is dry.      Findings: No rash.   Neurological:      Mental Status: She is alert and oriented to person, place, and time.   Psychiatric:         Behavior: Behavior normal.         Procedures           ED Course                                           MDM  Number of Diagnoses or Management Options     Amount and/or Complexity of Data Reviewed  Tests in the radiology section of CPT®: reviewed  Decide to obtain previous medical records or to obtain history from someone other than the patient: yes  Review and summarize past medical records: yes  Discuss the  patient with other providers: yes        Final diagnoses:   Closed fracture of one rib of left side, initial encounter       ED Disposition  ED Disposition     ED Disposition   Discharge    Condition   Stable    Comment   --             [facility] EMERGENCY DEPARTMENT  [address]  [facility] [address] [phone]    As needed, If symptoms worsen         Medication List      New Prescriptions    benzonatate 200 MG capsule  Commonly known as: TESSALON  Take 1 capsule by mouth 3 (Three) Times a Day As Needed for Cough.     HYDROcodone-acetaminophen 5-325 MG per tablet  Commonly known as: NORCO  Take 1 tablet by mouth Every 8 (Eight) Hours As Needed for Severe Pain .           Where to Get Your Medications      These medications were sent to [id number] - [facility] [address] [facility] AT [facility] [phone]  - [phone]   [address] [facility] [address]    Phone: [phone]   · benzonatate 200 MG capsule  · HYDROcodone-acetaminophen 5-325 MG per tablet          Francisco Javier Wilcox DO  07/23/22 0008

## 2022-10-06 ENCOUNTER — TRANSCRIBE ORDERS (OUTPATIENT)
Dept: PHYSICAL THERAPY | Facility: HOSPITAL | Age: 22
End: 2022-10-06

## 2022-10-06 DIAGNOSIS — M79.601 BILATERAL ARM PAIN: Primary | ICD-10-CM

## 2022-10-06 DIAGNOSIS — M79.602 BILATERAL ARM PAIN: Primary | ICD-10-CM

## 2022-10-13 ENCOUNTER — HOSPITAL ENCOUNTER (OUTPATIENT)
Dept: PHYSICAL THERAPY | Facility: HOSPITAL | Age: 22
Setting detail: THERAPIES SERIES
Discharge: HOME | End: 2022-10-13

## 2022-10-13 DIAGNOSIS — M79.601 BILATERAL ARM PAIN: Primary | ICD-10-CM

## 2022-10-13 DIAGNOSIS — M79.602 BILATERAL ARM PAIN: Primary | ICD-10-CM

## 2022-10-13 PROCEDURE — 97161 PT EVAL LOW COMPLEX 20 MIN: CPT | Performed by: PHYSICAL THERAPIST

## 2022-10-13 NOTE — THERAPY EVALUATION
Outpatient Physical Therapy Ortho Initial Evaluation  [facility]     Patient Name: Ruth Mancini  : 2000  MRN: [id number]  Today's Date: 10/13/2022      Visit Date: 10/13/2022  Visit number:     % Improvement:   trista HOLDER appointment:   10-24-22  Recert date:  11-3-22    Visit Diagnosis:  Numbless right UE and left hand        Patient Active Problem List   Diagnosis   • High risk multigravida in third trimester   • Maternal morbid obesity in third trimester, antepartum (HCC)   • Nausea and vomiting during pregnancy prior to 22 weeks gestation   • Tetrahydrocannabinol (THC) use disorder, mild, abuse   • Maternal tobacco use in third trimester   • Poor fetal growth affecting management of mother in first trimester   • Fetal size inconsistent with dates   • Fetal hydronephrosis in pregnancy, antepartum condition   • Suspected fetal anomaly, antepartum   • Carrier of genetic disorder   • History of asthma   • Irregular menstrual cycle   • Seasonal allergic rhinitis   • Maternal care for poor fetal growth in third trimester   • 33 weeks gestation of pregnancy   • 38 weeks gestation of pregnancy   • 39 weeks gestation of pregnancy   • Fetal tachycardia affecting management of mother   • S/P  section   • Dysuria   • Postoperative state        Past Medical History:   Diagnosis Date   • Allergic asthma     IgE mediated   • Allergic conjunctivitis    • Allergic rhinitis    • Allergic rhinitis due to pollen    • Astigmatism    • Chlamydia    • Common cold    • Conjunctivitis    • Cough    • Diarrhea    • Disorder of skin    • Dysfunction of eustachian tube    • Dysmenorrhea    • Exanthematous disorder    • Food poisoning    • Headache    • Herpes    • Hyperglycemia     mom says she is borderline diabetic   • Ingrowing nail    • Insect bite     nonvenomous   • Intrinsic asthma without status asthmaticus    • Irregular periods    • Knee pain     patellofemoral syndrome   • Migraine    • Nausea and  vomiting    • Need for immunization against influenza    • Otalgia, right ear    • Pain in right arm     bruising right forearm   • Pain in throat    • Pain in toe    • Pharyngitis    • Rhinorrhea     posterior   • Rib pain     actually intercostal spasm   • Smoker    • Substance abuse (HCC)    • Tick bite     history of tick in right ear with abrasion of the ear canal   • Trichomonal vaginitis in pregnancy in first trimester 2020   • Upper respiratory infection    • Wheezing         Past Surgical History:   Procedure Laterality Date   •  SECTION N/A 2021    Procedure:  SECTION PRIMARY;  Surgeon: Taz Francois MD;  Location: [facility] LABOR DELIVERY;  Service: Obstetrics/Gynecology;  Laterality: N/A;   • NO PAST SURGERIES     • OTHER SURGICAL HISTORY  2015    avulsion of nail plate       Visit Dx:     ICD-10-CM ICD-9-CM   1. Bilateral arm pain  M79.601 729.5    M79.602           Patient History     Row Name 10/13/22 1100             History    Chief Complaint Numbness  -SW      Brief Description of Current Complaint Patient began to lose feeling in right arm in  when she was in her third trimester of pregnancy. She has no pain in right arm, just numbness. She also has numbness in left hand. This left sided numbness initiated 2-3 months ago. Right UE numbness is pervasive throughout extremity exacerbated wtih cold weather or rain or sleeping. Numbness is intermittent.  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 Other 1 (comment)  none  -SW      Are you or can you be pregnant No  -SW         Pain     Pain at Present 0  -SW      Is your sleep disturbed? Yes  -SW      Is  medication used to assist with sleep? No  -SW         Daily Activities    Primary Language English  -            User Key  (r) = Recorded By, (t) = Taken By, (c) = Cosigned By    Initials Name Provider Type    SW KaranIsis nath Physical Therapist                 PT Ortho     Row Name 10/13/22 1100       Subjective Comments    Subjective Comments see pt hx  -SW       Subjective Pain    Able to rate subjective pain? yes  -    Pre-Treatment Pain Level 0  -SW       Special Tests/Palpation    Special Tests/Palpation --  -Spurling B, +Reverse PHalen on right, - left, -Phalen B, increased tingling with median nerve glide on right  -SW       General ROM    GENERAL ROM COMMENTS cervical and B UE AROM WNL's without exacerbation of symptoms  -SW       MMT (Manual Muscle Testing)    Neck/Trunk Neck Flexion;Neck Extension  -SW    Rt Upper Ext Rt Shoulder Flexion;Rt Shoulder ABduction;Rt Elbow Flexion;Rt Elbow Extension;Rt Wrist Flexion;Rt Wrist Extension  -SW    Lt Upper Ext Lt Shoulder Flexion;Lt Shoulder ABduction;Lt Elbow Extension;Lt Elbow Flexion;Lt Wrist Flexion;Lt Wrist Extension  -SW       MMT Neck/Trunk    Neck Flexion MMT, Gross Movement (5/5) normal  -SW    Neck Extension MMT, Gross Movement (5/5) normal  -SW       MMT Right Upper Ext    Rt Shoulder Flexion MMT, Gross Movement (5/5) normal  -SW    Rt Shoulder ABduction MMT, Gross Movement (5/5) normal  -SW    Rt Elbow Flexion MMT, Gross Movement: (4+/5) good plus  -SW    Rt Elbow Extension MMT, Gross Movement: (5/5) normal  -SW    Rt Wrist Flexion MMT, Gross Movement (4+/5) good plus  -SW    Rt Wrist Extension MMT, Gross Movement (4+/5) good plus  -SW       MMT Left Upper Ext    Lt Shoulder Flexion MMT, Gross Movement (5/5) normal  -SW    Lt Shoulder ABduction MMT, Gross Movement (5/5) normal  -SW    Lt Elbow Flexion MMT, Gross Movement (5/5) normal  -SW    Lt Elbow Extension MMT, Gross Movement (5/5) normal  -SW    Lt Wrist Flexion MMT, Gross Movement (5/5) normal   -SW    Lt Wrist Extension MMT, Gross Movement (5/5) normal  -SW        Strength Right    # Reps 3  -SW    Right Rung 2  -SW    Right  Test 1 41  -SW    Right  Test 2 51  -SW    Right  Test 3 45  -SW     Strength Average Right 45.67  -SW        Strength Left    # Reps 3  -SW    Left Rung 2  -SW    Left  Test 1 57  -SW    Left  Test 2 53  -SW    Left  Test 3 59  -SW     Strength Average Left 56.33  -SW       Sensation    Additional Comments decreased sensation right forearm  -SW       Hand  Strength     Strength Affected Side Right;Left  -SW          User Key  (r) = Recorded By, (t) = Taken By, (c) = Cosigned By    Initials Name Provider Type    Isis Blancas Physical Therapist                            Therapy Education  Education Details: median nerve glide C, hand median nerve glide, wrist flexor stretch  Given: HEP, Symptoms/condition management  Program: New  How Provided: Verbal, Demonstration, Written  Provided to: Patient  Level of Understanding: Teach back education performed, Verbalized, Demonstrated      PT OP Goals     Row Name 10/13/22 1100          PT Short Term Goals    STG Date to Achieve 10/27/22  -     STG 1 independent and compliant  with HEP  -SW     STG 2 15 on quick dash  -SW        Long Term Goals    LTG Date to Achieve 11/24/22  -SW     LTG 1 10 on quick dash  -SW     LTG 2 right  strength at least equal to left  -SW     LTG 3 normal sensation left forearm  -SW     LTG 4 -reverse Phalen on right  -SW     LTG 5 right elbow flexion and wrist flexion/extension 5/5  -SW     LTG 6 no increased tingling with median nerve glide C on right  -SW        Time Calculation    PT Goal Re-Cert Due Date 11/03/22  -           User Key  (r) = Recorded By, (t) = Taken By, (c) = Cosigned By    Initials Name Provider Type    Isis Blancas Physical Therapist                 PT Assessment/Plan     Row Name 10/13/22 1100          PT Assessment     Functional Limitations Decreased safety during functional activities;Limitation in home management;Limitations in community activities;Limitations in functional capacity and performance;Performance in leisure activities;Performance in self-care ADL;Performance in work activities  -     Impairments Impaired muscle endurance;Impaired muscle length;Impaired muscle power;Muscle strength;Impaired sensory integrity  -     Assessment Comments 22 yof presents with insidious onset numbness througout right UE and in left hand. She exhibits mild reduction in right forearm sensation and right elbow flexion and wrist flexion and extension strength as well as a moderate reduction in right  strength.  -SW     Rehab Potential Good  -SW     Patient/caregiver participated in establishment of treatment plan and goals Yes  -SW     Patient would benefit from skilled therapy intervention Yes  -SW        PT Plan    PT Frequency 2x/week  -SW     Predicted Duration of Therapy Intervention (PT) 6 weeks  -SW     Planned CPT's? PT EVAL LOW COMPLEXITY: 07296;PT THER PROC EA 15 MIN: 95707;PT THER ACT EA 15 MIN: 96090;PT MANUAL THERAPY EA 15 MIN: 94439;PT NEUROMUSC RE-EDUCATION EA 15 MIN: 57009;PT SELF CARE/HOME MGMT/TRAIN EA 15: 86186;PT HOT OR COLD PACK TREAT MCARE  -     Physical Therapy Interventions (Optional Details) home exercise program;manual therapy techniques;modalities;neuromuscular re-education;patient/family education;strengthening;stretching  -     PT Plan Comments Median nerve glide bilateral UE's and hand, wrist flexor stretching, and wrist/hand/forearm strengthening.  -           User Key  (r) = Recorded By, (t) = Taken By, (c) = Cosigned By    Initials Name Provider Type    Isis Blancas Physical Therapist                   OP Exercises     Row Name 10/13/22 1100             Subjective Comments    Subjective Comments see pt hx  -SW         Subjective Pain    Able to rate subjective pain? yes  -SW       "Pre-Treatment Pain Level 0  -SW         Exercise 1    Exercise Name 1 median nerve glide C  -SW      Reps 1 10 ea side  -SW         Exercise 2    Exercise Name 2 hand median nerve glide  -SW      Reps 2 10 ea side  -SW         Exercise 3    Exercise Name 3 wrist flexor stretch  -SW      Reps 3 30\"x2 ea side  -SW            User Key  (r) = Recorded By, (t) = Taken By, (c) = Cosigned By    Initials Name Provider Type    Isis Blancas Physical Therapist                              Outcome Measure Options: Quick DASH  Quick DASH  Open a tight or new jar.: Mild Difficulty  Do heavy household chores (e.g., wash walls, wash floors): Mild Difficulty  Carry a shopping bag or briefcase: No Difficulty  Wash your back: No Difficulty  Use a knife to cut food: Mild Difficulty  Recreational activities in which you take some force or impact through your arm, should or hand (e.g. golf, hammering, tennis, etc.): Mild Difficulty  During the past week, to what extent has your arm, shoulder, or hand problem interfered with your normal social activites with family, friends, neighbors or groups?: Not at all  During the past week, were you limited in your work or other regular daily activities as a result of your arm, shoulder or hand problem?: Slightly Limited  Arm, Shoulder, or hand pain: None  Tingling (pins and needles) in your arm, shoulder, or hand: Severe  During the past week, how much difficulty have you had sleeping because of the pain in your arm, shoulder or hand?: Severe Difficulty  Number of Questions Answered: 11  Quick DASH Score: 25         Time Calculation:     Start Time: 1101  Stop Time: 1141  Time Calculation (min): 40 min     Therapy Charges for Today     Code Description Service Date Service Provider Modifiers Qty    32448982117  PT EVAL LOW COMPLEXITY 3 10/13/2022 Isis Russo GP 1          PT G-Codes  Outcome Measure Options: Quick DASH  Quick DASH Score: 25         Isis Russo  10/13/2022      "

## 2022-10-18 ENCOUNTER — APPOINTMENT (OUTPATIENT)
Dept: PHYSICAL THERAPY | Facility: HOSPITAL | Age: 22
End: 2022-10-18

## 2022-10-21 ENCOUNTER — APPOINTMENT (OUTPATIENT)
Dept: PHYSICAL THERAPY | Facility: HOSPITAL | Age: 22
End: 2022-10-21

## 2022-10-26 ENCOUNTER — APPOINTMENT (OUTPATIENT)
Dept: PHYSICAL THERAPY | Facility: HOSPITAL | Age: 22
End: 2022-10-26

## 2022-10-28 ENCOUNTER — APPOINTMENT (OUTPATIENT)
Dept: PHYSICAL THERAPY | Facility: HOSPITAL | Age: 22
End: 2022-10-28

## 2022-10-31 ENCOUNTER — HOSPITAL ENCOUNTER (OUTPATIENT)
Dept: PHYSICAL THERAPY | Facility: HOSPITAL | Age: 22
Setting detail: THERAPIES SERIES
Discharge: HOME | End: 2022-10-31

## 2022-10-31 DIAGNOSIS — M79.602 BILATERAL ARM PAIN: Primary | ICD-10-CM

## 2022-10-31 DIAGNOSIS — M79.601 BILATERAL ARM PAIN: Primary | ICD-10-CM

## 2022-10-31 PROCEDURE — 97530 THERAPEUTIC ACTIVITIES: CPT

## 2022-10-31 PROCEDURE — 97140 MANUAL THERAPY 1/> REGIONS: CPT

## 2022-11-07 ENCOUNTER — HOSPITAL ENCOUNTER (OUTPATIENT)
Dept: PHYSICAL THERAPY | Facility: HOSPITAL | Age: 22
Setting detail: THERAPIES SERIES
Discharge: HOME | End: 2022-11-07

## 2022-11-07 DIAGNOSIS — M79.601 BILATERAL ARM PAIN: Primary | ICD-10-CM

## 2022-11-07 DIAGNOSIS — M79.602 BILATERAL ARM PAIN: Primary | ICD-10-CM

## 2022-11-07 PROCEDURE — 97110 THERAPEUTIC EXERCISES: CPT | Performed by: PHYSICAL THERAPIST

## 2022-11-07 PROCEDURE — 97140 MANUAL THERAPY 1/> REGIONS: CPT | Performed by: PHYSICAL THERAPIST

## 2022-11-07 NOTE — THERAPY PROGRESS REPORT/RE-CERT
Outpatient Physical Therapy Ortho Progress Note  [facility]     Patient Name: Ruth Mancini  : 2000  MRN: [id number]  Today's Date: 2022      Visit Date: 2022   Subjective Improvement: 50% better (average of both sides)  Visits 3/7  Visits approved ?  RTMD PRN  Recert Date 2022     Visit Diagnosis:  Numbless right UE and left hand    ICD-10-CM ICD-9-CM   1. Bilateral arm pain  M79.601 729.5    M79.602        Patient Active Problem List   Diagnosis   • High risk multigravida in third trimester   • Maternal morbid obesity in third trimester, antepartum (HCC)   • Nausea and vomiting during pregnancy prior to 22 weeks gestation   • Tetrahydrocannabinol (THC) use disorder, mild, abuse   • Maternal tobacco use in third trimester   • Poor fetal growth affecting management of mother in first trimester   • Fetal size inconsistent with dates   • Fetal hydronephrosis in pregnancy, antepartum condition   • Suspected fetal anomaly, antepartum   • Carrier of genetic disorder   • History of asthma   • Irregular menstrual cycle   • Seasonal allergic rhinitis   • Maternal care for poor fetal growth in third trimester   • 33 weeks gestation of pregnancy   • 38 weeks gestation of pregnancy   • 39 weeks gestation of pregnancy   • Fetal tachycardia affecting management of mother   • S/P  section   • Dysuria   • Postoperative state        Past Medical History:   Diagnosis Date   • Allergic asthma     IgE mediated   • Allergic conjunctivitis    • Allergic rhinitis    • Allergic rhinitis due to pollen    • Astigmatism    • Chlamydia    • Common cold    • Conjunctivitis    • Cough    • Diarrhea    • Disorder of skin    • Dysfunction of eustachian tube    • Dysmenorrhea    • Exanthematous disorder    • Food poisoning    • Headache    • Herpes    • Hyperglycemia     mom says she is borderline diabetic   • Ingrowing nail    • Insect bite     nonvenomous   • Intrinsic asthma without status  asthmaticus    • Irregular periods    • Knee pain     patellofemoral syndrome   • Migraine    • Nausea and vomiting    • Need for immunization against influenza    • Otalgia, right ear    • Pain in right arm     bruising right forearm   • Pain in throat    • Pain in toe    • Pharyngitis    • Rhinorrhea     posterior   • Rib pain     actually intercostal spasm   • Smoker    • Substance abuse (HCC)    • Tick bite     history of tick in right ear with abrasion of the ear canal   • Trichomonal vaginitis in pregnancy in first trimester 2020   • Upper respiratory infection    • Wheezing         Past Surgical History:   Procedure Laterality Date   •  SECTION N/A 2021    Procedure:  SECTION PRIMARY;  Surgeon: Taz Francois MD;  Location: [facility] LABOR DELIVERY;  Service: Obstetrics/Gynecology;  Laterality: N/A;   • NO PAST SURGERIES     • OTHER SURGICAL HISTORY  2015    avulsion of nail plate        PT Ortho     Row Name 22 1100       Subjective Pain    Able to rate subjective pain? yes  -SW    Pre-Treatment Pain Level 5  -SW          User Key  (r) = Recorded By, (t) = Taken By, (c) = Cosigned By    Initials Name Provider Type    Isis Blancas Physical Therapist                             PT Assessment/Plan     Row Name 22 1100          PT Assessment    Functional Limitations Decreased safety during functional activities;Limitation in home management;Limitations in community activities;Limitations in functional capacity and performance;Performance in leisure activities;Performance in self-care ADL;Performance in work activities  -     Impairments Impaired muscle endurance;Impaired muscle length;Impaired muscle power;Muscle strength;Impaired sensory integrity  -     Assessment Comments Patient exhbits improved sensation left UE and improved right wrist and elbow strength. She still exhibits numbness in bilateral UE's. She was instructed in proper posture and posture  "correction.  -SW     Rehab Potential Good  -SW     Patient/caregiver participated in establishment of treatment plan and goals Yes  -SW     Patient would benefit from skilled therapy intervention Yes  -SW        PT Plan    PT Frequency 2x/week  -SW     Predicted Duration of Therapy Intervention (PT) 6 weeks  -SW     Planned CPT's? PT THER PROC EA 15 MIN: 60626;PT THER ACT EA 15 MIN: 01531;PT MANUAL THERAPY EA 15 MIN: 99008;PT NEUROMUSC RE-EDUCATION EA 15 MIN: 48813;PT SELF CARE/HOME MGMT/TRAIN EA 15: 16177;PT HOT OR COLD PACK TREAT MCARE  -     Physical Therapy Interventions (Optional Details) manual therapy techniques;modalities;neuromuscular re-education;patient/family education;ROM (Range of Motion);strengthening;stretching  -SW     PT Plan Comments Cervical manual, deep neck flexor and upper back strengthening, posture correction.  -           User Key  (r) = Recorded By, (t) = Taken By, (c) = Cosigned By    Initials Name Provider Type    Isis Blancas Physical Therapist                   OP Exercises     Row Name 11/07/22 1100             Subjective Comments    Subjective Comments Patient reports less on left and similar numbness on right.  -SW         Subjective Pain    Able to rate subjective pain? yes  -      Pre-Treatment Pain Level 5  -SW         Exercise 1    Exercise Name 1 Pro II L2  -SW         Exercise 2    Exercise Name 2 recheck  -SW         Exercise 3    Exercise Name 3 manual therapy_median nerve glide, cervical ROM/stretching/suboccipital release  -SW      Time 3 15'  -SW         Exercise 4    Exercise Name 4 rows/ext/horizontal abd red tb  -SW      Reps 4 20 ea  -SW         Exercise 5    Exercise Name 5 posture training  -      Time 5 3'  -SW         Exercise 6    Exercise Name 6 chin tuck head lift  -SW      Reps 6 10\"x10  -SW            User Key  (r) = Recorded By, (t) = Taken By, (c) = Cosigned By    Initials Name Provider Type    SW Isis Russo Physical Therapist          "                     PT OP Goals     Row Name 11/07/22 1100          PT Short Term Goals    STG Date to Achieve 10/27/22  -     STG 1 independent and compliant  with HEP  -     STG 1 Progress Progressing  -     STG 2 15 on quick dash  -     STG 2 Progress Progressing  -        Long Term Goals    LTG Date to Achieve 11/24/22  -     LTG 1 10 on quick dash  -     LTG 1 Progress Progressing  -     LTG 2 right  strength at least equal to left  -     LTG 2 Progress Ongoing  -     LTG 3 normal sensation left forearm  -SW     LTG 3 Progress Met  -     LTG 4 -reverse Phalen on right  -SW     LTG 4 Progress Not Met  -     LTG 5 right elbow flexion and wrist flexion/extension 5/5  -SW     LTG 5 Progress Met  -     LTG 6 no increased tingling with median nerve glide C on right  -SW     LTG 6 Progress Not Met  -        Time Calculation    PT Goal Re-Cert Due Date 11/28/22  -           User Key  (r) = Recorded By, (t) = Taken By, (c) = Cosigned By    Initials Name Provider Type    Isis Blancas Physical Therapist                     Quick DASH  Open a tight or new jar.: Mild Difficulty  Do heavy household chores (e.g., wash walls, wash floors): Mild Difficulty  Carry a shopping bag or briefcase: No Difficulty  Wash your back: No Difficulty  Use a knife to cut food: Mild Difficulty  Recreational activities in which you take some force or impact through your arm, should or hand (e.g. golf, hammering, tennis, etc.): Mild Difficulty  During the past week, to what extent has your arm, shoulder, or hand problem interfered with your normal social activites with family, friends, neighbors or groups?: Not at all  During the past week, were you limited in your work or other regular daily activities as a result of your arm, shoulder or hand problem?: Slightly Limited  Arm, Shoulder, or hand pain: None  Tingling (pins and needles) in your arm, shoulder, or hand: Moderate  During the past week, how much  difficulty have you had sleeping because of the pain in your arm, shoulder or hand?: Mild Difficulty  Number of Questions Answered: 11  Quick DASH Score: 18.18         Time Calculation:   Start Time: 1155  Stop Time: 1233  Time Calculation (min): 38 min  Therapy Charges for Today     Code Description Service Date Service Provider Modifiers Qty    24370237898 HC PT THER PROC EA 15 MIN 11/7/2022 Isis Russo GP 2    92076447706 HC PT MANUAL THERAPY EA 15 MIN 11/7/2022 Isis Russo GP 1          PT G-Codes  Quick DASH Score: 18.18         Isis Russo  11/7/2022

## (undated) DEVICE — GLV SURG MICROTOUCH LTX SZ7 STRL

## (undated) DEVICE — SUT VIC PLS 0 CTX 36IN UD VCP978H

## (undated) DEVICE — PAD,ABDOMINAL,8"X10",ST,LF: Brand: MEDLINE

## (undated) DEVICE — GLV SURG SENSICARE PI LF PF 7.5

## (undated) DEVICE — SUT GUT CHRM 2/0 SH 27IN G123H

## (undated) DEVICE — UNDYED BRAIDED (POLYGLACTIN 910), SYNTHETIC ABSORBABLE SUTURE: Brand: COATED VICRYL

## (undated) DEVICE — SUT MONOCRYL 4/0 PS2 27IN Y426H ETY426H

## (undated) DEVICE — SUT GUT CHRM 1 CTX 36IN 905H

## (undated) DEVICE — GARMENT,MEDLINE,DVT,INT,CALF,MED, GEN2: Brand: MEDLINE

## (undated) DEVICE — SUT  GUT PLAIN 2/0 CT1 27IN 843H

## (undated) DEVICE — TRY SPINE PENCAN 24GA X4IN

## (undated) DEVICE — PK C/SECT 60

## (undated) DEVICE — TRAXI PANNICULUS RETRACTOR WITH RETENTUS TECHNOLOGY (BMI 30-50): Brand: TRAXI® PANNICULUS RETRACTOR

## (undated) DEVICE — 3M™ STERI-STRIP™ REINFORCED ADHESIVE SKIN CLOSURES, R1547, 1/2 IN X 4 IN (12 MM X 100 MM), 6 STRIPS/ENVELOPE: Brand: 3M™ STERI-STRIP™

## (undated) DEVICE — DRSNG TELFA PAD NONADH STR 1S 3X8IN

## (undated) DEVICE — ADHS LIQ MASTISOL 2/3ML

## (undated) DEVICE — SYS SKIN CLS DERMABOND PRINEO W/22CM MESH TP